# Patient Record
Sex: MALE | Race: WHITE | NOT HISPANIC OR LATINO | Employment: OTHER | URBAN - METROPOLITAN AREA
[De-identification: names, ages, dates, MRNs, and addresses within clinical notes are randomized per-mention and may not be internally consistent; named-entity substitution may affect disease eponyms.]

---

## 2019-03-23 ENCOUNTER — HOSPITAL ENCOUNTER (OUTPATIENT)
Dept: URGENT CARE | Facility: CLINIC | Age: 79
Discharge: HOME | End: 2019-03-23
Attending: NURSE PRACTITIONER

## 2020-02-29 ENCOUNTER — HOSPITAL ENCOUNTER (OUTPATIENT)
Dept: URGENT CARE | Facility: CLINIC | Age: 80
Discharge: HOME | End: 2020-02-29
Attending: FAMILY MEDICINE

## 2020-03-06 ENCOUNTER — HOSPITAL ENCOUNTER (OUTPATIENT)
Dept: ULTRASOUND IMAGING | Facility: HOSPITAL | Age: 80
Discharge: HOME | End: 2020-03-06
Attending: UROLOGY

## 2020-03-06 LAB
ANION GAP SERPL CALC-SCNC: 18 MMOL/L (ref 8–19)
BUN SERPL-MCNC: 10 MG/DL (ref 5–25)
BUN/CREAT SERPL: 11 {RATIO} (ref 6–20)
CALCIUM SERPL-MCNC: 9.3 MG/DL (ref 8.7–10.4)
CHLORIDE SERPL-SCNC: 100 MMOL/L (ref 99–111)
CONV CO2: 24 MMOL/L (ref 22–32)
CREAT UR-MCNC: 0.93 MG/DL (ref 0.7–1.2)
GFR SERPLBLD BASED ON 1.73 SQ M-ARVRAT: >60 ML/MIN/{1.73_M2}
GLUCOSE SERPL-MCNC: 120 MG/DL (ref 70–99)
OSMOLALITY SERPL CALC.SUM OF ELEC: 286 MOSM/KG (ref 273–304)
POTASSIUM SERPL-SCNC: 3.9 MMOL/L (ref 3.5–5.3)
SODIUM SERPL-SCNC: 138 MMOL/L (ref 135–147)

## 2020-03-12 ENCOUNTER — HOSPITAL ENCOUNTER (OUTPATIENT)
Dept: SURGERY | Facility: CLINIC | Age: 80
Discharge: HOME | End: 2020-03-12
Attending: UROLOGY

## 2020-03-12 ENCOUNTER — OFFICE VISIT CONVERTED (OUTPATIENT)
Dept: UROLOGY | Facility: CLINIC | Age: 80
End: 2020-03-12
Attending: UROLOGY

## 2020-03-13 ENCOUNTER — OFFICE VISIT CONVERTED (OUTPATIENT)
Dept: SURGERY | Facility: CLINIC | Age: 80
End: 2020-03-13
Attending: PHYSICIAN ASSISTANT

## 2020-03-14 LAB — BACTERIA UR CULT: ABNORMAL

## 2020-05-14 ENCOUNTER — PROCEDURE VISIT CONVERTED (OUTPATIENT)
Dept: UROLOGY | Facility: CLINIC | Age: 80
End: 2020-05-14
Attending: UROLOGY

## 2020-05-14 ENCOUNTER — HOSPITAL ENCOUNTER (OUTPATIENT)
Dept: SURGERY | Facility: CLINIC | Age: 80
Discharge: HOME | End: 2020-05-14
Attending: UROLOGY

## 2020-07-14 ENCOUNTER — OFFICE VISIT CONVERTED (OUTPATIENT)
Dept: UROLOGY | Facility: CLINIC | Age: 80
End: 2020-07-14
Attending: UROLOGY

## 2020-09-23 ENCOUNTER — OFFICE VISIT CONVERTED (OUTPATIENT)
Dept: UROLOGY | Facility: CLINIC | Age: 80
End: 2020-09-23
Attending: UROLOGY

## 2020-09-23 ENCOUNTER — CONVERSION ENCOUNTER (OUTPATIENT)
Dept: SURGERY | Facility: CLINIC | Age: 80
End: 2020-09-23

## 2020-10-28 ENCOUNTER — CONVERSION ENCOUNTER (OUTPATIENT)
Dept: SURGERY | Facility: CLINIC | Age: 80
End: 2020-10-28

## 2020-10-28 ENCOUNTER — OFFICE VISIT CONVERTED (OUTPATIENT)
Dept: UROLOGY | Facility: CLINIC | Age: 80
End: 2020-10-28
Attending: UROLOGY

## 2021-01-22 ENCOUNTER — CONVERSION ENCOUNTER (OUTPATIENT)
Dept: SURGERY | Facility: CLINIC | Age: 81
End: 2021-01-22

## 2021-01-22 ENCOUNTER — OFFICE VISIT CONVERTED (OUTPATIENT)
Dept: UROLOGY | Facility: CLINIC | Age: 81
End: 2021-01-22
Attending: NURSE PRACTITIONER

## 2021-02-26 ENCOUNTER — CONVERSION ENCOUNTER (OUTPATIENT)
Dept: SURGERY | Facility: CLINIC | Age: 81
End: 2021-02-26

## 2021-03-03 ENCOUNTER — HOSPITAL ENCOUNTER (OUTPATIENT)
Dept: VACCINE CLINIC | Facility: HOSPITAL | Age: 81
Discharge: HOME | End: 2021-03-03
Attending: INTERNAL MEDICINE

## 2021-03-24 ENCOUNTER — HOSPITAL ENCOUNTER (OUTPATIENT)
Dept: VACCINE CLINIC | Facility: HOSPITAL | Age: 81
Discharge: HOME | End: 2021-03-24
Attending: INTERNAL MEDICINE

## 2021-03-26 ENCOUNTER — OFFICE VISIT CONVERTED (OUTPATIENT)
Dept: UROLOGY | Facility: CLINIC | Age: 81
End: 2021-03-26
Attending: UROLOGY

## 2021-04-22 ENCOUNTER — HOSPITAL ENCOUNTER (OUTPATIENT)
Dept: PREADMISSION TESTING | Facility: HOSPITAL | Age: 81
Discharge: HOME | End: 2021-04-22
Attending: UROLOGY

## 2021-04-22 LAB
ANION GAP SERPL CALC-SCNC: 12 MMOL/L (ref 8–19)
BASOPHILS # BLD AUTO: 0.05 10*3/UL (ref 0–0.2)
BASOPHILS NFR BLD AUTO: 1.1 % (ref 0–3)
BUN SERPL-MCNC: 11 MG/DL (ref 5–25)
BUN/CREAT SERPL: 11 {RATIO} (ref 6–20)
CALCIUM SERPL-MCNC: 10.2 MG/DL (ref 8.7–10.4)
CHLORIDE SERPL-SCNC: 101 MMOL/L (ref 99–111)
CONV ABS IMM GRAN: 0.02 10*3/UL (ref 0–0.2)
CONV CO2: 27 MMOL/L (ref 22–32)
CONV IMMATURE GRAN: 0.5 % (ref 0–1.8)
CREAT UR-MCNC: 1 MG/DL (ref 0.7–1.2)
DEPRECATED RDW RBC AUTO: 45 FL (ref 35.1–43.9)
EOSINOPHIL # BLD AUTO: 0.06 10*3/UL (ref 0–0.7)
EOSINOPHIL # BLD AUTO: 1.4 % (ref 0–7)
ERYTHROCYTE [DISTWIDTH] IN BLOOD BY AUTOMATED COUNT: 14 % (ref 11.6–14.4)
GFR SERPLBLD BASED ON 1.73 SQ M-ARVRAT: >60 ML/MIN/{1.73_M2}
GLUCOSE SERPL-MCNC: 106 MG/DL (ref 70–99)
HCT VFR BLD AUTO: 41 % (ref 42–52)
HGB BLD-MCNC: 13.5 G/DL (ref 14–18)
INR PPP: 1.01 (ref 2–3)
LYMPHOCYTES # BLD AUTO: 1.02 10*3/UL (ref 1–5)
LYMPHOCYTES NFR BLD AUTO: 23.2 % (ref 20–45)
MCH RBC QN AUTO: 29.1 PG (ref 27–31)
MCHC RBC AUTO-ENTMCNC: 32.9 G/DL (ref 33–37)
MCV RBC AUTO: 88.4 FL (ref 80–96)
MONOCYTES # BLD AUTO: 0.35 10*3/UL (ref 0.2–1.2)
MONOCYTES NFR BLD AUTO: 8 % (ref 3–10)
NEUTROPHILS # BLD AUTO: 2.89 10*3/UL (ref 2–8)
NEUTROPHILS NFR BLD AUTO: 65.8 % (ref 30–85)
NRBC CBCN: 0 % (ref 0–0.7)
OSMOLALITY SERPL CALC.SUM OF ELEC: 282 MOSM/KG (ref 273–304)
PLATELET # BLD AUTO: 239 10*3/UL (ref 130–400)
PMV BLD AUTO: 10.1 FL (ref 9.4–12.4)
POTASSIUM SERPL-SCNC: 4.4 MMOL/L (ref 3.5–5.3)
PROTHROMBIN TIME: 11.1 S (ref 9.4–12)
RBC # BLD AUTO: 4.64 10*6/UL (ref 4.7–6.1)
SODIUM SERPL-SCNC: 136 MMOL/L (ref 135–147)
WBC # BLD AUTO: 4.39 10*3/UL (ref 4.8–10.8)

## 2021-04-23 LAB — SARS-COV-2 RNA SPEC QL NAA+PROBE: NOT DETECTED

## 2021-04-24 LAB
AMPICILLIN SUSC ISLT: 16
AMPICILLIN SUSC ISLT: >=32
AMPICILLIN+SULBAC SUSC ISLT: 4
AMPICILLIN+SULBAC SUSC ISLT: 8
BACTERIA UR CULT: ABNORMAL
CEFAZOLIN SUSC ISLT: 8
CEFAZOLIN SUSC ISLT: <=4
CEFEPIME SUSC ISLT: <=0.12
CEFEPIME SUSC ISLT: <=0.12
CEFTAZIDIME SUSC ISLT: <=1
CEFTAZIDIME SUSC ISLT: <=1
CEFTRIAXONE SUSC ISLT: <=0.25
CEFTRIAXONE SUSC ISLT: <=0.25
CIPROFLOXACIN SUSC ISLT: <=0.25
CIPROFLOXACIN SUSC ISLT: <=0.25
ERTAPENEM SUSC ISLT: <=0.12
ERTAPENEM SUSC ISLT: <=0.12
GENTAMICIN SUSC ISLT: <=1
GENTAMICIN SUSC ISLT: <=1
LEVOFLOXACIN SUSC ISLT: <=0.12
LEVOFLOXACIN SUSC ISLT: <=0.12
NITROFURANTOIN SUSC ISLT: 32
NITROFURANTOIN SUSC ISLT: 64
PIP+TAZO SUSC ISLT: <=4
PIP+TAZO SUSC ISLT: <=4
TMP SMX SUSC ISLT: <=20
TMP SMX SUSC ISLT: <=20
TOBRAMYCIN SUSC ISLT: <=1
TOBRAMYCIN SUSC ISLT: <=1

## 2021-05-06 ENCOUNTER — HOSPITAL ENCOUNTER (OUTPATIENT)
Dept: GENERAL RADIOLOGY | Facility: HOSPITAL | Age: 81
Discharge: HOME | End: 2021-05-06
Attending: UROLOGY

## 2021-05-07 ENCOUNTER — OFFICE VISIT CONVERTED (OUTPATIENT)
Dept: UROLOGY | Facility: CLINIC | Age: 81
End: 2021-05-07
Attending: UROLOGY

## 2021-05-07 ENCOUNTER — CONVERSION ENCOUNTER (OUTPATIENT)
Dept: SURGERY | Facility: CLINIC | Age: 81
End: 2021-05-07

## 2021-05-10 NOTE — PROCEDURES
Procedure Note      Patient Name: Abimael Randall   Patient ID: [id number]   Sex: Male   YOB: 1940    Primary Care Provider: Kwame Mckeon MD   Referring Provider: Kwame Mckeon MD    Visit Date: January 22, 2021    Provider: JF Grimaldo   Location: [facility] [place] [facility]   Location Address: [address] [place] [address]  [id number]   Location Phone: [phone]             80-year-old male patient presents in office on referral from his home health nurse.  The patient had complaints of his Mathias catheter not draining.    The patient's Mathias catheter was examined and found to be free of any kinks.    Old Mathias catheter was removed and catheter was replaced with a 16fr coude catheter with a 10 ml balloon. yellow urine was seen in catheter bag.           Assessment  · Urinary retention     788.20/R33.9      Plan  · Orders  o Insertion of indwelling Mathias catheter (44019) - 788.20/R33.9 - 01/22/2021  · Instructions  o Patient to follow-up in office in 4/21 as scheduled             Electronically Signed by: JF Grimaldo -Author on January 24, 2021 01:10:16 PM

## 2021-05-10 NOTE — PROCEDURES
Procedure Note      Patient Name: Abimael Randall   Patient ID: [id number]   Sex: Male   YOB: 1940    Primary Care Provider: Kwame Mckeon MD   Referring Provider: Kwame Mckeon MD    Visit Date: May 14, 2020    Provider: Rosalva Mireles MD   Location: [facility]   Location Address: [address] [place] [address]  [id number]   Location Phone: [phone]          Cystoscopy Procedure:  PROCEDURE: His indwelling ramos was removed. Flexible cystoscope was passed per urethra into the bladder without difficulty after proper consent. There were no urethral abnormalities noted. The prostatic urethra was notable enlarged with bilateral coapting lobes and median lobe. The bladder was inspected in a systematic meridian fashion. There were some small diverticula and mild trabeculations. There was a lesion with erythema and edema at the posterior bladder wall; likely from indwelling catheter. No other lesions or tumors noted. 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 Some urine was obtained through the scope to be sent for pathology. Both ureteral orifices were identified and were normal in appearance. The flexible cystoscope was removed. A 18fr coude catheter was inserted without difficulty and secured. The patient tolerated the procedure well.   FOLLOW UP OFFICE NOTE: No complaints today; denies changes since last visit. Indwelling catheter in place.           Assessment  · BPH (benign prostatic hyperplasia)     600.00/N40.0  · Bladder lump     596.89/N32.89    Problems Reconciled  Plan  · Orders  o Cystoscopy (22917) - - 05/16/2020  · Medications  o Medications have been Reconciled  o Transition of Care or Provider Policy  · Instructions  o Electronically Identified Patient Education Materials Provided Electronically     Urine to be sent for cytology; will notify of result  Findings discussed with patient; lesion believed to be associated with indwelling Ramos; obtain cytology and continue to  monitor.  Patient aware that may warrant biopsy at some point.  Enlarged prostate with intravesical tissue; findings of chronic bladder outlet obstruction including trabeculations and diverticula.    Tolerated procedure well.  Instructions provided.  Patient to maintain Mathias catheter until further work-up performed.  Schedule urodynamic testing  Patient to follow-up, zoom visit okay, after UDS for further management discussion    All questions addressed             Electronically Signed by: Rosalva Mireles MD -Author on May 16, 2020 01:44:25 PM

## 2021-05-12 ENCOUNTER — CONVERSION ENCOUNTER (OUTPATIENT)
Dept: SURGERY | Facility: CLINIC | Age: 81
End: 2021-05-12

## 2021-05-13 NOTE — PROGRESS NOTES
"   Progress Note      Patient Name: Abimael Randall   Patient ID: [id number]   Sex: Male   YOB: 1940    Primary Care Provider: Kwame Mckeon MD   Referring Provider: Kwame Mckeon MD    Visit Date: July 14, 2020    Provider: Rosalva Mireles MD   Location: [facility]   Location Address: [address] [place] [address]  [id number]   Location Phone: [phone]          Chief Complaint  · Pt is here for urological concerns      History Of Present Illness     1300 cc of retained urine. Patient noted immediate relief of pain.     Denies h/o bph; did not take prostate medication. Placed on Flomax in hospital, has continued to take.   States he was voiding without issue prior to presentation; denies baseline weak stream, sensation of incomplete emptying, hesitancy, frequency, or straining.    Hoping to get catheter out, but willing to keep while w/u being performed.     Had renal u/s and bmp prior to visit. DESIREE with resolution of hydronephrosis.   Cr 3/20: 0.93  CT during hospitalization revealed >100gm prostate    Update 7/14/20: Presents for f/u after UDS and cystoscopy. Indwelling catheter remains in place; not particularly bothered by it. Changed monthly by caretenders. Hoping to get catheter out eventually.     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 Creatinine of 7 on presentation went down with catheter drainage. Had gone 3 days prior to presentation without urinating.  Workup in ER included CT of the abdomen and pelvis indicating severe bladder distention, bilateral hydronephrosis, and protatomegaly. Mathias catheter was placed in ER without difficulty yield >1300 cc of retained urine. Patient noted immediate relief of pain.     Denies h/o bph; did not take " "prostate medication. Placed on Flomax in hospital, has continued to take.   States he was voiding without issue prior to presentation; denies baseline weak stream, sensation of incomplete emptying, hesitancy, frequency, or straining.    Hoping to get catheter out, but willing to keep while w/u being performed.     Had renal u/s and bmp prior to visit. DESIREE with resolution of hydronephrosis.   Cr 3/20: 0.93  CT during hospitalization revealed >100gm prostate    Update 7/14/20: Presents for f/u after UDS and cystoscopy. Indwelling catheter remains in place; not particularly bothered by it. Changed monthly by caretenders. Hoping to get catheter out eventually.     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  · Head and Face  o Head  o :   § Inspection  § : Normocephalic, atraumatic  o Face  o :   § Inspection  § : No facial lesions  · Respiratory  o Respiratory Effort  o : Breathing is unlabored without accessory muscle use  o Inspection of Chest  o : Normal appearance, no retractions  · Cardiovascular  o Heart  o : normal rate   · Skin and Subcutaneous Tissue  o General Inspection  o : No rashes, lesions or areas of discoloration present. Skin turgor is normal.  · Neurologic  o Mental Status Examination  o :   § Orientation  § : alert and oriented x 3  o Gait and Station  o :   § Gait Screening  § : Ambulating wiithout difficulty  · Psychiatric  o Mood and " "Affect  o : mood normal, affect appropriate              Assessment  · BPH loc w urin obs/LUTS       Benign prostatic hyperplasia with lower urinary tract symptoms     600.21/N40.1  · Urinary retention     788.20/R33.9    Problems Reconciled  Plan  · Medications  o Medications have been Reconciled  o Transition of Care or Provider Policy  · Instructions  o Electronically Identified Patient Education Materials Provided Electronically     UDS and cystoscopy- results discussed;   Would likely benefit from bladder outlet obstruction procedure; recommend TURP r/b/a discussed at length including possibility of need for prolonged catheterization despite surgery    Understands that due to h/o hydro and GERTRUDIS assc with urinary retention, will warrant indefinite decompression of the bladder; recommend continued decompression via catheter; options provided of indwelling vs CIC; patient does not believe he could do CIC, prefers indwelling for now    \"extremely leary of surgery\" uncertain if willing to undergo a procedure  Continue Flomax; start finasteride; side effects discussed  Continue routine cath changes monthly Home Health    F/u 3 mo; wishes to discuss surgical mgt with PCP to assess surgical risks from a medical standpoint  All questions addressed    Total time for encounter greater than 15 minutes due to counseling and coordination of care which dominated greater than 51% of the encounter.             Electronically Signed by: Rosalva Mireles MD -Author on July 17, 2020 05:12:01 PM  "

## 2021-05-13 NOTE — PROGRESS NOTES
"   Progress Note      Patient Name: Abimael Randall   Patient ID: [id number]   Sex: Male   YOB: 1940    Primary Care Provider: Kwame Mckeon MD   Referring Provider: Kwame Mckeon MD    Visit Date: September 23, 2020    Provider: Rosalva Mireles MD   Location: [facility]   Location Address: [address] [place] [address]  [id number]   Location Phone: [phone]          Chief Complaint  · Patient is here for urological symptoms      History Of Present Illness     1300 cc of retained urine. Patient noted immediate relief of pain.     Denies h/o bph; did not take prostate medication. Placed on Flomax in hospital, has continued to take.   States he was voiding without issue prior to presentation; denies baseline weak stream, sensation of incomplete emptying, hesitancy, frequency, or straining.    Hoping to get catheter out, but willing to keep while w/u being performed.     Had renal u/s and bmp prior to visit. DESIREE with resolution of hydronephrosis.   Cr 3/20: 0.93  CT during hospitalization revealed >100gm prostate    Update 7/14/20: Presents for f/u after UDS and cystoscopy. Indwelling catheter remains in place; not particularly bothered by it. Changed monthly by caretenders. Hoping to get catheter out eventually.     Update 9/23/20: Presents for follow up due to indwelling ramos issues. C/o leaking around catheter; no pain. Was exchanged by home health on Sunday due to catheter malfunction; leakage around penis 2-3 days prior. Continues to deny significant bother with catheter. Wonders if he should proceed surgery.     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 Creatinine of 7 on presentation went down with catheter drainage. Had gone 3 " days prior to presentation without urinating.  Workup in ER included CT of the abdomen and pelvis indicating severe bladder distention, bilateral hydronephrosis, and protatomegaly. Ramos catheter was placed in ER without difficulty yield >1300 cc of retained urine. Patient noted immediate relief of pain.     Denies h/o bph; did not take prostate medication. Placed on Flomax in hospital, has continued to take.   States he was voiding without issue prior to presentation; denies baseline weak stream, sensation of incomplete emptying, hesitancy, frequency, or straining.    Hoping to get catheter out, but willing to keep while w/u being performed.     Had renal u/s and bmp prior to visit. DESIREE with resolution of hydronephrosis.   Cr 3/20: 0.93  CT during hospitalization revealed >100gm prostate    Update 7/14/20: Presents for f/u after UDS and cystoscopy. Indwelling catheter remains in place; not particularly bothered by it. Changed monthly by caretenders. Hoping to get catheter out eventually.     Update 9/23/20: Presents for follow up due to indwelling ramos issues. C/o leaking around catheter; no pain. Was exchanged by home health on Sunday due to catheter malfunction; leakage around penis 2-3 days prior. Continues to deny significant bother with catheter. Wonders if he should proceed surgery.     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; urge urinary incontinence; strong detrusor with weak flow       Past Medical History  Prostate Disorder; Sleep disorder         Past Surgical History  Colonoscopy; Tonsilectomy         Medication List  Colace 100 mg oral capsule; finasteride 5 mg oral tablet; tamsulosin 0.4 mg oral capsule         Allergy List  PENICILLINS       Allergies Reconciled  Family Medical History  Family history of breast cancer         Social History  Tobacco (Former)         Review of Systems  · Constitutional  o Denies  o : fever,  "chills  · Gastrointestinal  o Denies  o : nausea, vomiting      Vitals  Date Time BP Position Site L\R Cuff Size HR RR TEMP (F) WT  HT  BMI kg/m2 BSA m2 O2 Sat HC       09/23/2020 02:07 PM       14  152lbs 0oz 5'  11\" 21.2 1.86           Physical Examination  · Constitutional  o Appearance  o : Well nourished, well developed patient in no acute distress.  · Eyes  o Conjunctivae  o : Conjunctivae normal  o Sclerae  o : Sclerae white  · Ears, Nose, Mouth and Throat  o Ears  o :   § Hearing  § : Intact to conversational voice both ears  § Ears  § : Normal  o Nose  o :   § External Nose  § : Appearance normal  · Neck  o Inspection/Palpation  o : Normal appearance, trachea midline  · Respiratory  o Respiratory Effort  o : Breathing unlabored without accessory muscle use  o Inspection of Chest  o : Normal appearance, no retractions  o Auscultation of Lungs  o : Normal breath sounds  · Cardiovascular  o Heart  o : Normal Rate  · Gastrointestinal  o Abdominal Examination  o : Appears soft and nondistended  · Skin and Subcutaneous Tissue  o General Inspection  o : No rashes, lesions, or areas of discoloration present  o General Palpation  o : Skin Turgor Normal  · Neurologic  o Mental Status Examination  o :   § Orientation  § : Alert and Oriented X3  o Gait and Station  o :   § Gait Screening  § : Ambulating without difficulty  · Psychiatric  o Mood and Affect  o : Mood normal, affect appropriate          Results  · In-Office Procedures  o Surgical procedure  § IOP - Bladder Scan/Residual Urine (78998)   § Specimen vol Ur: 129   § Catheterization for residual urine (25202)       Assessment  · BPH loc w urin obs/LUTS       Benign prostatic hyperplasia with lower urinary tract symptoms     600.21/N40.1  · Urinary retention     788.20/R33.9    Problems Reconciled  Plan  · Medications  o Medications have been Reconciled  o Transition of Care or Provider Policy  · Instructions  o Electronically Identified Patient Education " Materials Provided Electronically     Residual despite ramos, 120cc; catheter exchanged without issue  Home health to contine to change catheter every month     Discussed again that he would likely benefit from bladder outlet obstruction procedure; Discussed robotic simple prostatectomy versus TURP r/b/a of each discussed at length including possibility of need for prolonged catheterization despite surgery. Given size of gland, 100cc, would likely benefit more from simple prostatectomy as may need staged TURP required more than one procedure.     Understands that due to h/o hydro and GERTRUDIS assc with urinary retention, will warrant indefinite decompression of the bladder; recommend continued decompression via catheter; options provided of indwelling vs CIC; patient does not believe he could do CIC, prefers indwelling for now    Continues to be uncertain if willing to undergo a procedure  Continue Flomax and finasteride; side effects discussed  Continue routine cath changes monthly Home Health    F/u 3 mo as previously scheduled  All questions addressed    Total time for encounter greater than 25 minutes due to counseling and coordination of care which dominated greater than 51% of the encounter.             Electronically Signed by: Rosalva Mireles MD -Author on September 23, 2020 03:23:58 PM

## 2021-05-13 NOTE — PROGRESS NOTES
"   Progress Note      Patient Name: Abimael Randall   Patient ID: [id number]   Sex: Male   YOB: 1940    Primary Care Provider: Kwame Mckeon MD   Referring Provider: Kwame Mckeon MD    Visit Date: October 28, 2020    Provider: Rosalva Mierles MD   Location[place] [facility]   Location Address: [address] [place] [address]  [id number]   Location Phone: [phone]          Chief Complaint  · Patient is here for urological symptoms      History Of Present Illness     1300 cc of retained urine. Patient noted immediate relief of pain.     Denies h/o bph; did not take prostate medication. Placed on Flomax in hospital, has continued to take.   States he was voiding without issue prior to presentation; denies baseline weak stream, sensation of incomplete emptying, hesitancy, frequency, or straining.    Hoping to get catheter out, but willing to keep while w/u being performed.     Had renal u/s and bmp prior to visit. DESIREE with resolution of hydronephrosis.   Cr 3/20: 0.93  CT during hospitalization revealed >100gm prostate    Update 7/14/20: Presents for f/u after UDS and cystoscopy. Indwelling catheter remains in place; not particularly bothered by it. Changed monthly by caretenders. Hoping to get catheter out eventually.     Update 9/23/20: Presents for follow up due to indwelling ramos issues. C/o leaking around catheter; no pain. Was exchanged by home health on Sunday due to catheter malfunction; leakage around penis 2-3 days prior. Continues to deny significant bother with catheter. Wonders if he should proceed surgery.     Update 10/28/2020: Patient presents for follow-up.  Catheter in place.  Denies issues catheter.  Changed last week.. No complaints today.     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 Creatinine of 7 on presentation went down with catheter drainage. Had gone 3 days prior to presentation without urinating.  Workup in ER included CT of the abdomen and pelvis indicating severe bladder distention, bilateral hydronephrosis, and protatomegaly. Ramos catheter was placed in ER without difficulty yield >1300 cc of retained urine. Patient noted immediate relief of pain.     Denies h/o bph; did not take prostate medication. Placed on Flomax in hospital, has continued to take.   States he was voiding without issue prior to presentation; denies baseline weak stream, sensation of incomplete emptying, hesitancy, frequency, or straining.    Hoping to get catheter out, but willing to keep while w/u being performed.     Had renal u/s and bmp prior to visit. DESIREE with resolution of hydronephrosis.   Cr 3/20: 0.93  CT during hospitalization revealed >100gm prostate    Update 7/14/20: Presents for f/u after UDS and cystoscopy. Indwelling catheter remains in place; not particularly bothered by it. Changed monthly by caretenders. Hoping to get catheter out eventually.     Update 9/23/20: Presents for follow up due to indwelling ramos issues. C/o leaking around catheter; no pain. Was exchanged by home health on Sunday due to catheter malfunction; leakage around penis 2-3 days prior. Continues to deny significant bother with catheter. Wonders if he should proceed surgery.     Update 10/28/2020: Patient presents for follow-up.  Catheter in place.  Denies issues catheter.  Changed last week.. No complaints today.     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; urge urinary incontinence; strong detrusor with weak flow       Past Medical History  Prostate Disorder; Sleep disorder         Past Surgical History  Colonoscopy; Tonsilectomy         Medication List  Colace 100 mg oral capsule; finasteride 5 mg oral  "tablet; tamsulosin 0.4 mg oral capsule         Allergy List  PENICILLINS       Allergies Reconciled  Family Medical History  Family history of breast cancer         Social History  Tobacco (Former)         Review of Systems  · Constitutional  o Denies  o : chills, fever  · Gastrointestinal  o Denies  o : nausea, vomiting      Vitals  Date Time BP Position Site L\R Cuff Size HR RR TEMP (F) WT  HT  BMI kg/m2 BSA m2 O2 Sat FR L/min FiO2 HC       10/28/2020 12:25 PM       16  159lbs 6oz 5'  11\" 22.23 1.9             Physical Examination  · Constitutional  o Appearance  o : Well nourished, well developed patient in no acute distress.  · Eyes  o Conjunctivae  o : Conjunctivae normal  o Sclerae  o : Sclerae white  · Ears, Nose, Mouth and Throat  o Ears  o :   § Hearing  § : Intact to conversational voice both ears  § Ears  § : Normal  o Nose  o :   § External Nose  § : Appearance normal  · Neck  o Inspection/Palpation  o : Normal appearance, trachea midline  · Lymphatic  o Neck  o : No palpable lymphadenopathy present  · Skin and Subcutaneous Tissue  o General Inspection  o : No rashes, lesions, or areas of discoloration present  o General Palpation  o : Skin Turgor Normal  · Neurologic  o Mental Status Examination  o :   § Orientation  § : Alert and Oriented X3  o Gait and Station  o :   § Gait Screening  § : Ambulating without difficulty  · Psychiatric  o Mood and Affect  o : Mood normal, affect appropriate              Assessment  · BPH loc w urin obs/LUTS       Benign prostatic hyperplasia with lower urinary tract symptoms     600.21/N40.1  · Urinary retention     788.20/R33.9    Problems Reconciled  Plan  · Orders  o Renal Ultrasound-bilateral (66466, 62269) - 600.21/N40.1, 788.20/R33.9 - 04/28/2021  · Medications  o Medications have been Reconciled  o Transition of Care or Provider Policy  · Instructions  o Electronically Identified Patient Education Materials Provided Electronically     No issues with catheter; " remains uncertain if he would like to go through procedure; discussed again that proceeding with surgical intervention is his decision given associated risk versus benefits.  Discussed again that he would likely benefit from bladder outlet obstruction procedure; Discussed robotic simple prostatectomy versus TURP r/b/a of each discussed at length including possibility of need for prolonged catheterization despite surgery. Given size of gland, 100cc, would likely benefit more from simple prostatectomy as may need staged TURP required more than one procedure. Discussed again that proceeding with surgical intervention is his decision given associated risk versus benefits and his personal goals of care.    Continue Flomax and finasteride; side effects discussed  Continue routine cath changes monthly Home Health    F/u 6 months with surveillance upper tract imaging , DESIREE prior or earlier as needed  All questions addressed               Electronically Signed by: Rosalva Mireles MD -Author on October 28, 2020 12:39:00 PM

## 2021-05-14 VITALS — WEIGHT: 152 LBS | BODY MASS INDEX: 21.28 KG/M2 | HEIGHT: 71 IN | RESPIRATION RATE: 14 BRPM

## 2021-05-14 VITALS — RESPIRATION RATE: 12 BRPM | HEIGHT: 71 IN | WEIGHT: 154 LBS | BODY MASS INDEX: 21.56 KG/M2

## 2021-05-14 VITALS — WEIGHT: 154.37 LBS | HEIGHT: 71 IN | RESPIRATION RATE: 16 BRPM | BODY MASS INDEX: 21.61 KG/M2

## 2021-05-14 VITALS — BODY MASS INDEX: 22.31 KG/M2 | HEIGHT: 71 IN | WEIGHT: 159.37 LBS | RESPIRATION RATE: 16 BRPM

## 2021-05-14 NOTE — PROGRESS NOTES
Progress Note      Patient Name: Abimael Randall   Patient ID: [id number]   Sex: Male   YOB: 1940    Primary Care Provider: Kwame Mckeon MD   Referring Provider: Kwame Mckeon MD    Visit Date: March 26, 2021    Provider: Rosalva Mireles MD   Location: [facility] [place] [facility]   Location Address: [address] [place] [address]  [id number]   Location Phone: [phone]          Chief Complaint  · Patient is here for urological symptoms      History Of Present Illness     1300 cc of retained urine. Patient noted immediate relief of pain.     Denies h/o bph; did not take prostate medication. Placed on Flomax in hospital, has continued to take.   States he was voiding without issue prior to presentation; denies baseline weak stream, sensation of incomplete emptying, hesitancy, frequency, or straining.    Hoping to get catheter out, but willing to keep while w/u being performed.     Had renal u/s and bmp prior to visit. DESIREE with resolution of hydronephrosis.   Cr 3/20: 0.93  CT during hospitalization revealed >100gm prostate    Update 7/14/20: Presents for f/u after UDS and cystoscopy. Indwelling catheter remains in place; not particularly bothered by it. Changed monthly by caretenders. Hoping to get catheter out eventually.     Update 9/23/20: Presents for follow up due to indwelling ramos issues. C/o leaking around catheter; no pain. Was exchanged by home health on Sunday due to catheter malfunction; leakage around penis 2-3 days prior. Continues to deny significant bother with catheter. Wonders if he should proceed surgery.     Update 10/28/2020: Patient presents for follow-up.  Catheter in place.  Denies issues catheter.  Changed last week.. No complaints today.     Update 3/26/2021: Patient presents for routine catheter exchange.  Has been thinking a great deal about options for management.  Becoming progressively more bothered by catheter.  Would like to proceed with  "surgery.    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 Creatinine of 7 on presentation went down with catheter drainage. Had gone 3 days prior to presentation without urinating.  Workup in ER included CT of the abdomen and pelvis indicating severe bladder distention, bilateral hydronephrosis, and protatomegaly. Ramos catheter was placed in ER without difficulty yield >1300 cc of retained urine. Patient noted immediate relief of pain.     Denies h/o bph; did not take prostate medication. Placed on Flomax in hospital, has continued to take.   States he was voiding without issue prior to presentation; denies baseline weak stream, sensation of incomplete emptying, hesitancy, frequency, or straining.    Hoping to get catheter out, but willing to keep while w/u being performed.     Had renal u/s and bmp prior to visit. DESIREE with resolution of hydronephrosis.   Cr 3/20: 0.93  CT during hospitalization revealed >100gm prostate    Update 7/14/20: Presents for f/u after UDS and cystoscopy. Indwelling catheter remains in place; not particularly bothered by it. Changed monthly by caretenders. Hoping to get catheter out eventually.     Update 9/23/20: Presents for follow up due to indwelling ramos issues. C/o leaking around catheter; no pain. Was exchanged by home health on Sunday due to catheter malfunction; leakage around penis 2-3 days prior. Continues to deny significant bother with catheter. Wonders if he should proceed surgery.     Update 10/28/2020: Patient presents for follow-up.  Catheter in place.  Denies issues catheter.  Changed last week.. No complaints today.     Update 3/26/2021: Patient presents for routine catheter exchange.  Has been thinking a great deal about options for " management.  Becoming progressively more bothered by catheter.  Would like to proceed with surgery.    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; urge urinary incontinence; strong detrusor with weak flow       Past Medical History  Prostate Disorder; Sleep disorder         Past Surgical History  Colonoscopy; Tonsilectomy         Medication List  Colace 100 mg oral capsule; finasteride 5 mg oral tablet; oxybutynin chloride 10 mg oral tablet extended release 24hr; tamsulosin 0.4 mg oral capsule; trazodone 50 mg oral tablet         Allergy List  PENICILLINS       Allergies Reconciled  Family Medical History  Family history of breast cancer         Social History  Tobacco (Former)         Review of Systems  · Constitutional  o Denies  o : fever, chills  · Gastrointestinal  o Denies  o : nausea, vomiting      Physical Examination  · Constitutional  o Appearance  o : Well nourished, well developed patient in no acute distress.  · Eyes  o Conjunctivae  o : Conjunctivae normal  o Sclerae  o : Sclerae white  · Ears, Nose, Mouth and Throat  o Ears  o :   § Hearing  § : Intact to conversational voice both ears  § Ears  § : Normal  o Nose  o :   § External Nose  § : Appearance normal  · Neck  o Inspection/Palpation  o : Normal appearance, trachea midline  · Skin and Subcutaneous Tissue  o General Inspection  o : No rashes, lesions, or areas of discoloration present  o General Palpation  o : Skin Turgor Normal  · Neurologic  o Mental Status Examination  o :   § Orientation  § : Alert and Oriented X3  o Gait and Station  o :   § Gait Screening  § : Ambulating without difficulty  · Psychiatric  o Mood and Affect  o : Mood normal, affect appropriate              Assessment  · BPH loc w urin obs/LUTS       Benign prostatic hyperplasia with lower urinary tract symptoms     600.21/N40.1  · Urinary retention     788.20/R33.9    Problems Reconciled  Plan  · Orders  o Insertion of  "indwelling Mathias catheter (66907) - 600.21/N40.1, 788.20/R33.9 - 03/26/2021  · Medications  o Medications have been Reconciled  o Transition of Care or Provider Policy  · Instructions  o Electronically Identified Patient Education Materials Provided Electronically     100gm prostate. Believe that he would  likely benefit from bladder outlet obstruction procedure.   Patient with considerable prostatomegaly which may be best served by robotic assisted laparoscopic simple prostatectomy.  The procedure was discussed at length.  Risks, benefits, and alternatives discussed with patient.  Risks including bleeding, infection, damage to surrounding structures, pain, need for further procedures, durability of procedure, need for postop catheter, sexual side effects, and possibility of worsening LUTS requiring medications postop, and possibility of persistent incomplete emptying requiring catheterization despite procedure.  Aware that this is a procedure under general anesthesia and the associated risk.    Patient agreeable to bladder outlet procedure as motivated to void spontaneously without catheterization.  Plan for robotic assisted laparoscopic simple prostatectomy   Schedule OR  All questions addressed     MDM moderate: 1 chronic illness with exacerbation progression or side effect of treatment; decision regarding surgery with identified patient or procedure risk factors\">catheter exchanged without difficulty; patient tolerated well    Discussed again options for mgt of his BPH urinary retention given his >100gm prostate. Believe that he would  likely benefit from bladder outlet obstruction procedure.   Patient with considerable prostatomegaly which may be best served by robotic assisted laparoscopic simple prostatectomy.  The procedure was discussed at length.  Risks, benefits, and alternatives discussed with patient.  Risks including bleeding, infection, damage to surrounding structures, pain, need for further " procedures, durability of procedure, need for postop catheter, sexual side effects, and possibility of worsening LUTS requiring medications postop, and possibility of persistent incomplete emptying requiring catheterization despite procedure.  Aware that this is a procedure under general anesthesia and the associated risk.    Patient agreeable to bladder outlet procedure as motivated to void spontaneously without catheterization.  Plan for robotic assisted laparoscopic simple prostatectomy   Schedule OR  All questions addressed     MDM moderate: 1 chronic illness with exacerbation progression or side effect of treatment; decision regarding surgery with identified patient or procedure risk factors             Electronically Signed by: Rosalva Mireles MD -Author on March 26, 2021 02:02:23 PM

## 2021-05-15 VITALS — HEIGHT: 71 IN | WEIGHT: 158 LBS | RESPIRATION RATE: 14 BRPM | BODY MASS INDEX: 22.12 KG/M2

## 2021-05-15 VITALS — HEIGHT: 71 IN | WEIGHT: 152.25 LBS | BODY MASS INDEX: 21.31 KG/M2 | RESPIRATION RATE: 12 BRPM

## 2021-05-15 VITALS — OXYGEN SATURATION: 99 % | RESPIRATION RATE: 12 BRPM | HEART RATE: 78 BPM

## 2021-05-26 ENCOUNTER — OFFICE VISIT CONVERTED (OUTPATIENT)
Dept: UROLOGY | Facility: CLINIC | Age: 81
End: 2021-05-26
Attending: UROLOGY

## 2021-05-26 ENCOUNTER — CONVERSION ENCOUNTER (OUTPATIENT)
Dept: SURGERY | Facility: CLINIC | Age: 81
End: 2021-05-26

## 2021-06-06 NOTE — PROGRESS NOTES
Progress Note      Patient Name: Abimael Randall   Patient ID: [id number]   Sex: Male   YOB: 1940    Primary Care Provider: Kwame Mckeon MD   Referring Provider: Kwame Mckeon MD    Visit Date: May 26, 2021    Provider: Rosalva Mireles MD   Location: [facility] [place] [facility]   Location Address: [address] [place] [address]  [id number]   Location Phone: [phone]          Chief Complaint  · Patient is here for urological symptoms      History Of Present Illness     1300 cc of retained urine. Patient noted immediate relief of pain.     Denies h/o bph; did not take prostate medication. Placed on Flomax in hospital, has continued to take.   States he was voiding without issue prior to presentation; denies baseline weak stream, sensation of incomplete emptying, hesitancy, frequency, or straining.    Hoping to get catheter out, but willing to keep while w/u being performed.     Had renal u/s and bmp prior to visit. DESIREE with resolution of hydronephrosis.   Cr 3/20: 0.93  CT during hospitalization revealed >100gm prostate    Update 7/14/20: Presents for f/u after UDS and cystoscopy. Indwelling catheter remains in place; not particularly bothered by it. Changed monthly by caretenders. Hoping to get catheter out eventually.     Update 9/23/20: Presents for follow up due to indwelling ramos issues. C/o leaking around catheter; no pain. Was exchanged by home health on Sunday due to catheter malfunction; leakage around penis 2-3 days prior. Continues to deny significant bother with catheter. Wonders if he should proceed surgery.     Update 10/28/2020: Patient presents for follow-up.  Catheter in place.  Denies issues catheter.  Changed last week.. No complaints today.     Update 3/26/2021: Patient presents for routine catheter exchange.  Has been thinking a great deal about options for management.  Becoming progressively more bothered by catheter.  Would like to proceed with surgery.    Update  "5/7/2021: Patient presents for postop follow-up after robotic simple prostatectomy.  Doing well.  Cystogram prior to appointment without leak.  Denies significant pain.  Denies constipation.  Has had decreased appetite in general since surgery.  No other complaints.  Catheter removed in office; able to void approximately 100 cc; continue to monitor    Update 5/26/2021: Patient presents for follow-up PVR.  Reports good strong stream.  No urinary complaints today.  Feels great.  Pleased with results of surgery.  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 Creatinine of 7 on presentation went down with catheter drainage. Had gone 3 days prior to presentation without urinating.  Workup in ER included CT of the abdomen and pelvis indicating severe bladder distention, bilateral hydronephrosis, and protatomegaly. Mathias catheter was placed in ER without difficulty yield >1300 cc of retained urine. Patient noted immediate relief of pain.     Denies h/o bph; did not take prostate medication. Placed on Flomax in hospital, has continued to take.   States he was voiding without issue prior to presentation; denies baseline weak stream, sensation of incomplete emptying, hesitancy, frequency, or straining.    Hoping to get catheter out, but willing to keep while w/u being performed.     Had renal u/s and bmp prior to visit. DESIREE with resolution of hydronephrosis.   Cr 3/20: 0.93  CT during hospitalization revealed >100gm prostate    Update 7/14/20: Presents for f/u after UDS and cystoscopy. Indwelling catheter remains in place; not particularly bothered by it. Changed monthly by caretenders. Hoping to get catheter out eventually.     Update 9/23/20: Presents for follow up due to " indwelling ramos issues. C/o leaking around catheter; no pain. Was exchanged by home health on Sunday due to catheter malfunction; leakage around penis 2-3 days prior. Continues to deny significant bother with catheter. Wonders if he should proceed surgery.     Update 10/28/2020: Patient presents for follow-up.  Catheter in place.  Denies issues catheter.  Changed last week.. No complaints today.     Update 3/26/2021: Patient presents for routine catheter exchange.  Has been thinking a great deal about options for management.  Becoming progressively more bothered by catheter.  Would like to proceed with surgery.    Update 5/7/2021: Patient presents for postop follow-up after robotic simple prostatectomy.  Doing well.  Cystogram prior to appointment without leak.  Denies significant pain.  Denies constipation.  Has had decreased appetite in general since surgery.  No other complaints.  Catheter removed in office; able to void approximately 100 cc; continue to monitor    Update 5/26/2021: Patient presents for follow-up PVR.  Reports good strong stream.  No urinary complaints today.  Feels great.  Pleased with results of surgery.  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; urge urinary incontinence; strong detrusor with weak flow       Past Medical History  Prostate Disorder; Sleep disorder         Past Surgical History  Colonoscopy; Tonsilectomy         Medication List  Colace 100 mg oral capsule; finasteride 5 mg oral tablet; oxybutynin chloride 10 mg oral tablet extended release 24hr; tamsulosin 0.4 mg oral capsule; trazodone 50 mg oral tablet         Allergy List  PENICILLINS       Allergies Reconciled  Family Medical History  Family history of breast cancer         Social History  Alcohol (Former); Tobacco (Former)         Review of Systems  · Constitutional  o Denies  o : fever, chills  · Gastrointestinal  o Denies  o : nausea,  "vomiting      Vitals  Date Time BP Position Site L\R Cuff Size HR RR TEMP (F) WT  HT  BMI kg/m2 BSA m2 O2 Sat FR L/min FiO2 HC       05/26/2021 11:19 AM       13  134lbs 0oz 5'  6\" 21.63 1.68             Physical Examination  · Constitutional  o Appearance  o : Well nourished, well developed patient in no acute distress.  · Eyes  o Conjunctivae  o : Conjunctivae normal  o Sclerae  o : Sclerae white  · Ears, Nose, Mouth and Throat  o Ears  o :   § Hearing  § : Intact to conversational voice both ears  § Ears  § : Normal  o Nose  o :   § External Nose  § : Appearance normal  · Skin and Subcutaneous Tissue  o General Inspection  o : No rashes, lesions, or areas of discoloration present  o General Palpation  o : Skin Turgor Normal  · Neurologic  o Mental Status Examination  o :   § Orientation  § : Alert and Oriented X3  o Gait and Station  o :   § Gait Screening  § : Ambulating without difficulty  · Psychiatric  o Mood and Affect  o : Mood normal, affect appropriate          Results  · In-Office Procedures  o Surgical procedure  § IOP - Bladder Scan/Residual Urine (86119)   § Specimen vol Ur: 59       Assessment  · BPH loc w urin obs/LUTS       Benign prostatic hyperplasia with lower urinary tract symptoms     600.21/N40.1  · Urinary retention     788.20/R33.9      Plan  · Medications  o Medications have been Reconciled  o Transition of Care or Provider Policy  · Instructions  o Electronically Identified Patient Education Materials Provided Electronically     Doing well; emptying without significant postvoid residual   Activity okay to be performed as tolerated at this point   Follow-up 3 months repeat PVR or earlier as needed  All questions addressed             Electronically Signed by: Rosalva Mireles MD -Author on May 26, 2021 12:20:23 PM  "

## 2021-06-06 NOTE — PROGRESS NOTES
Progress Note      Patient Name: Abimael Randall   Patient ID: [id number]   Sex: Male   YOB: 1940    Primary Care Provider: Kwame Mckeon MD   Referring Provider: Kwame Mckeon MD    Visit Date: May 7, 2021    Provider: Rosalva Mireles MD   Location: [facility]   Location Address: [address] [place] [address]  [id number]   Location Phone: [phone]          Chief Complaint  · Patient is here for urological symptoms      History Of Present Illness     1300 cc of retained urine. Patient noted immediate relief of pain.     Denies h/o bph; did not take prostate medication. Placed on Flomax in hospital, has continued to take.   States he was voiding without issue prior to presentation; denies baseline weak stream, sensation of incomplete emptying, hesitancy, frequency, or straining.    Hoping to get catheter out, but willing to keep while w/u being performed.     Had renal u/s and bmp prior to visit. DESIREE with resolution of hydronephrosis.   Cr 3/20: 0.93  CT during hospitalization revealed >100gm prostate    Update 7/14/20: Presents for f/u after UDS and cystoscopy. Indwelling catheter remains in place; not particularly bothered by it. Changed monthly by caretenders. Hoping to get catheter out eventually.     Update 9/23/20: Presents for follow up due to indwelling ramos issues. C/o leaking around catheter; no pain. Was exchanged by home health on Sunday due to catheter malfunction; leakage around penis 2-3 days prior. Continues to deny significant bother with catheter. Wonders if he should proceed surgery.     Update 10/28/2020: Patient presents for follow-up.  Catheter in place.  Denies issues catheter.  Changed last week.. No complaints today.     Update 3/26/2021: Patient presents for routine catheter exchange.  Has been thinking a great deal about options for management.  Becoming progressively more bothered by catheter.  Would like to proceed with surgery.    Update  "5/7/2021: Patient presents for postop follow-up after robotic simple prostatectomy.  Doing well.  Cystogram prior to appointment without leak.  Denies significant pain.  Denies constipation.  Has had decreased appetite in general since surgery.  No other complaints.  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 Creatinine of 7 on presentation went down with catheter drainage. Had gone 3 days prior to presentation without urinating.  Workup in ER included CT of the abdomen and pelvis indicating severe bladder distention, bilateral hydronephrosis, and protatomegaly. Ramos catheter was placed in ER without difficulty yield >1300 cc of retained urine. Patient noted immediate relief of pain.     Denies h/o bph; did not take prostate medication. Placed on Flomax in hospital, has continued to take.   States he was voiding without issue prior to presentation; denies baseline weak stream, sensation of incomplete emptying, hesitancy, frequency, or straining.    Hoping to get catheter out, but willing to keep while w/u being performed.     Had renal u/s and bmp prior to visit. DESIREE with resolution of hydronephrosis.   Cr 3/20: 0.93  CT during hospitalization revealed >100gm prostate    Update 7/14/20: Presents for f/u after UDS and cystoscopy. Indwelling catheter remains in place; not particularly bothered by it. Changed monthly by caretenders. Hoping to get catheter out eventually.     Update 9/23/20: Presents for follow up due to indwelling ramos issues. C/o leaking around catheter; no pain. Was exchanged by home health on Sunday due to catheter malfunction; leakage around penis 2-3 days prior. Continues " "to deny significant bother with catheter. Wonders if he should proceed surgery.     Update 10/28/2020: Patient presents for follow-up.  Catheter in place.  Denies issues catheter.  Changed last week.. No complaints today.     Update 3/26/2021: Patient presents for routine catheter exchange.  Has been thinking a great deal about options for management.  Becoming progressively more bothered by catheter.  Would like to proceed with surgery.    Update 5/7/2021: Patient presents for postop follow-up after robotic simple prostatectomy.  Doing well.  Cystogram prior to appointment without leak.  Denies significant pain.  Denies constipation.  Has had decreased appetite in general since surgery.  No other complaints.  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; urge urinary incontinence; strong detrusor with weak flow       Past Medical History  Prostate Disorder; Sleep disorder         Past Surgical History  Colonoscopy; Tonsilectomy         Medication List  Colace 100 mg oral capsule; finasteride 5 mg oral tablet; oxybutynin chloride 10 mg oral tablet extended release 24hr; tamsulosin 0.4 mg oral capsule; trazodone 50 mg oral tablet         Allergy List  PENICILLINS       Allergies Reconciled  Family Medical History  Family history of breast cancer         Social History  Tobacco (Former)         Review of Systems  · Constitutional  o Denies  o : fever, chills  · Gastrointestinal  o Denies  o : nausea, vomiting      Vitals  Date Time BP Position Site L\R Cuff Size HR RR TEMP (F) WT  HT  BMI kg/m2 BSA m2 O2 Sat FR L/min FiO2 HC       05/07/2021 11:02 AM       16   5'  11\"               Physical Examination  · Constitutional  o Appearance  o : Well nourished, well developed patient in no acute distress.  · Eyes  o Conjunctivae  o : Conjunctivae normal  o Sclerae  o : Sclerae " white  · Ears, Nose, Mouth and Throat  o Ears  o :   § Hearing  § : Intact to conversational voice both ears  § Ears  § : Normal  o Nose  o :   § External Nose  § : Appearance normal  · Skin and Subcutaneous Tissue  o General Inspection  o : No rashes, lesions, or areas of discoloration present  o General Palpation  o : Skin Turgor Normal  · Neurologic  o Mental Status Examination  o :   § Orientation  § : Alert and Oriented X3  o Gait and Station  o :   § Gait Screening  § : Ambulating without difficulty  · Psychiatric  o Mood and Affect  o : Mood normal, affect appropriate     Abdomen, soft, nondistended, nontender; incisions, clean, dry, intact               Assessment  · BPH loc w urin obs/LUTS       Benign prostatic hyperplasia with lower urinary tract symptoms     600.21/N40.1  · Urinary retention     788.20/R33.9    Problems Reconciled  Plan  · Medications  o Medications have been Reconciled  o Transition of Care or Provider Policy  · Instructions  o Electronically Identified Patient Education Materials Provided Electronically     Doing well status post robotic simple prostatectomy last week; cystogram reviewed; no evidence of leak.  Catheter removed and void trial performed; patient able to void approximately 100 cc.  Encourage patient to go home drink plenty of fluids and is to return to office prior to close of business for repeat post void residual bladder scan.    Provided reassurance; discussed postoperative expectations and management.  Doing very well.    Follow-up next week or earlier as needed  All questions addressed             Electronically Signed by: Rosalva Mireles MD -Author on May 7, 2021 11:44:37 AM

## 2021-07-15 VITALS — RESPIRATION RATE: 16 BRPM | HEIGHT: 71 IN | BODY MASS INDEX: 21.48 KG/M2

## 2021-07-15 VITALS — HEIGHT: 71 IN | RESPIRATION RATE: 12 BRPM | BODY MASS INDEX: 19.48 KG/M2 | WEIGHT: 139.12 LBS

## 2021-07-15 VITALS — HEIGHT: 66 IN | BODY MASS INDEX: 21.53 KG/M2 | WEIGHT: 134 LBS | RESPIRATION RATE: 13 BRPM

## 2021-08-05 ENCOUNTER — TELEPHONE (OUTPATIENT)
Dept: UROLOGY | Facility: CLINIC | Age: 81
End: 2021-08-05

## 2021-08-05 NOTE — TELEPHONE ENCOUNTER
Patient said had surgery 3 months ago.  He takes Oxybutynin 10 mg BID. He has bladder spasms.  He will go several days without having any, and then they return.  Last night he had 2 that were bad.  Asked to be called.

## 2021-08-06 NOTE — TELEPHONE ENCOUNTER
I spoke with patient, explained that he needed to continue to take oxybutynin and if spasms get worst, to call and let us know. If refills are needed, call and notify the office. Patient voiced understanding.

## 2021-08-24 PROBLEM — G47.9 SLEEP DISORDER: Status: ACTIVE | Noted: 2021-08-24

## 2021-08-24 PROBLEM — N42.9 PROSTATE DISORDER: Status: ACTIVE | Noted: 2021-08-24

## 2021-08-24 RX ORDER — OXYBUTYNIN CHLORIDE 10 MG/1
TABLET, EXTENDED RELEASE ORAL
COMMUNITY
Start: 2021-08-02 | End: 2021-09-03 | Stop reason: SDUPTHER

## 2021-08-24 RX ORDER — FINASTERIDE 5 MG/1
TABLET, FILM COATED ORAL
COMMUNITY
Start: 2021-06-02 | End: 2022-02-16 | Stop reason: SDUPTHER

## 2021-08-24 RX ORDER — TRAZODONE HYDROCHLORIDE 50 MG/1
TABLET ORAL
COMMUNITY
Start: 2021-08-02

## 2021-08-25 ENCOUNTER — OFFICE VISIT (OUTPATIENT)
Dept: UROLOGY | Facility: CLINIC | Age: 81
End: 2021-08-25

## 2021-08-25 VITALS — BODY MASS INDEX: 20.31 KG/M2 | HEIGHT: 66 IN | RESPIRATION RATE: 14 BRPM | WEIGHT: 126.4 LBS

## 2021-08-25 DIAGNOSIS — N40.0 BENIGN PROSTATIC HYPERPLASIA WITHOUT LOWER URINARY TRACT SYMPTOMS: ICD-10-CM

## 2021-08-25 DIAGNOSIS — N42.9 PROSTATE DISORDER: Primary | ICD-10-CM

## 2021-08-25 LAB — SCAN: NORMAL

## 2021-08-25 PROCEDURE — 51798 US URINE CAPACITY MEASURE: CPT | Performed by: UROLOGY

## 2021-08-25 PROCEDURE — 99212 OFFICE O/P EST SF 10 MIN: CPT | Performed by: UROLOGY

## 2021-08-26 NOTE — PROGRESS NOTES
"    UROLOGY OFFICE FOLLOW-UP NOTE    Subjective   HPI  Abimael Randall is a 81 y.o. male history of acute urinary retention secondary to BPH requiring Mathias catheter status post suprapubic prostatectomy, 4/2021: Patient reports doing very well.  States he is voiding with good strong stream.  Denies bothersome lower urinary tract symptoms.  No urologic complaints today.      Results for orders placed or performed in visit on 08/25/21   Bladder Scan   Result Value Ref Range    Scan 077ml          Medical History:  Past Medical History:   Diagnosis Date   • Prostate disorder    • Sleep disorder         Social History:  Social History     Socioeconomic History   • Marital status: Single     Spouse name: Not on file   • Number of children: Not on file   • Years of education: Not on file   • Highest education level: Not on file   Tobacco Use   • Smoking status: Former Smoker   • Smokeless tobacco: Never Used   Substance and Sexual Activity   • Alcohol use: Not Currently     Comment: 05/26/2021        Family History:  Family History   Problem Relation Age of Onset   • Breast cancer Sister         60s        Surgical History:  Past Surgical History:   Procedure Laterality Date   • CATARACT EXTRACTION, BILATERAL  2021        Allergies:  Allergies   Allergen Reactions   • Penicillins Other (See Comments)        Current Medications:  Current Outpatient Medications   Medication Sig Dispense Refill   • finasteride (PROSCAR) 5 MG tablet      • oxybutynin XL (DITROPAN-XL) 10 MG 24 hr tablet      • traZODone (DESYREL) 50 MG tablet        No current facility-administered medications for this visit.       Review of systems  Constitutional: Denies fever chills  GI: Denies nausea, vomiting    Objective     Vital Signs:   Resp 14   Ht 167.6 cm (65.98\")   Wt 57.3 kg (126 lb 6.4 oz)   BMI 20.41 kg/m²       Physical exam  No acute distress, thin   awake alert and oriented  Mood normal; affect normal    Bladder Scan interpretation " 08/25/2021    Estimation of residual urine via BVI [address] Bladder Scan  Residual Urine: 77 ml  Indication: Prostate disorder    Benign prostatic hyperplasia without lower urinary tract symptoms   Position: Supine  Examination: Incremental scanning of the suprapubic area using 2.0 MHz transducer using copious amounts of acoustic gel.   Findings: An anechoic area was demonstrated which represented the bladder, with measurement of residual urine as noted. I inspected this myself. In that the residual urine was stable or insignificant, refer to plan for treatment and plan necessary at this time.     Problem List:  Patient Active Problem List   Diagnosis   • Prostate disorder   • Sleep disorder       Assessment/Plan     Diagnoses and all orders for this visit:    1. Prostate disorder (Primary)  -     Bladder Scan    2. Benign prostatic hyperplasia without lower urinary tract symptoms         Doing well; emptying without significant postvoid residual   Activity as tolerated  Follow-up 6 months repeat PVR or earlier as needed  All questions addressed         Signed:  Rosalva Matos MD  08/25/21  22:21 EDT

## 2021-09-02 ENCOUNTER — TELEPHONE (OUTPATIENT)
Dept: UROLOGY | Facility: CLINIC | Age: 81
End: 2021-09-02

## 2021-09-02 DIAGNOSIS — N42.9 PROSTATE DISORDER: ICD-10-CM

## 2021-09-02 DIAGNOSIS — N40.0 BENIGN PROSTATIC HYPERPLASIA WITHOUT LOWER URINARY TRACT SYMPTOMS: Primary | ICD-10-CM

## 2021-09-02 NOTE — TELEPHONE ENCOUNTER
Patient asked for refills on Oxybutyin 10 mg BID 90-day refills.  Pharmacy in chart is correct.  Please call when sent.

## 2021-09-03 RX ORDER — OXYBUTYNIN CHLORIDE 10 MG/1
10 TABLET, EXTENDED RELEASE ORAL DAILY
Qty: 30 TABLET | Refills: 2 | Status: SHIPPED | OUTPATIENT
Start: 2021-09-03 | End: 2021-12-02

## 2021-09-03 NOTE — TELEPHONE ENCOUNTER
PER [facility]ANDO PATIENT CALLED BACK STATING HE'S OUT OF OXYBUTYNIN AND WOULD NEED A REFILL TODAY. PHARMACY TOLD PT THEY HAVEN'T BEEN ABLE TO REACH US TO RENEW SCRIPT. PLEASE SEND TO [facility] [id number].

## 2021-10-05 ENCOUNTER — TELEPHONE (OUTPATIENT)
Dept: UROLOGY | Facility: CLINIC | Age: 81
End: 2021-10-05

## 2021-10-05 NOTE — TELEPHONE ENCOUNTER
Pt states that he has a bulge in his pubic area about the size of the palm of his hand. There is no pain but he thinks it may be a recurring hernia and that Dr. Matos did his last hernia repair when she did prostate surgery. Should he be seen for this.

## 2021-10-06 NOTE — TELEPHONE ENCOUNTER
Spoke to pt, relayed message. Pt says it is not giving him any issues at all. Pt says if it starts to bother him, we will call back to let us know and maybe we can see him sooner than his already scheduled Feb 2022 appt.

## 2022-02-16 DIAGNOSIS — N40.0 BENIGN PROSTATIC HYPERPLASIA WITHOUT LOWER URINARY TRACT SYMPTOMS: Primary | ICD-10-CM

## 2022-02-16 RX ORDER — FINASTERIDE 5 MG/1
5 TABLET, FILM COATED ORAL DAILY
Qty: 90 TABLET | Refills: 1 | Status: SHIPPED | OUTPATIENT
Start: 2022-02-16 | End: 2022-05-17

## 2022-02-16 RX ORDER — TAMSULOSIN HYDROCHLORIDE 0.4 MG/1
1 CAPSULE ORAL DAILY
Qty: 90 CAPSULE | Refills: 4 | Status: SHIPPED | OUTPATIENT
Start: 2022-02-16

## 2022-02-28 PROBLEM — N40.0 BPH (BENIGN PROSTATIC HYPERPLASIA): Status: ACTIVE | Noted: 2022-02-28

## 2022-03-04 ENCOUNTER — OFFICE VISIT (OUTPATIENT)
Dept: UROLOGY | Facility: CLINIC | Age: 82
End: 2022-03-04

## 2022-03-04 VITALS — WEIGHT: 125 LBS | HEIGHT: 66 IN | BODY MASS INDEX: 20.09 KG/M2

## 2022-03-04 DIAGNOSIS — N40.0 BENIGN PROSTATIC HYPERPLASIA WITHOUT LOWER URINARY TRACT SYMPTOMS: Primary | ICD-10-CM

## 2022-03-04 DIAGNOSIS — R39.15 URINARY URGENCY: ICD-10-CM

## 2022-03-04 DIAGNOSIS — N32.89 BLADDER SPASMS: ICD-10-CM

## 2022-03-04 PROCEDURE — 99214 OFFICE O/P EST MOD 30 MIN: CPT | Performed by: UROLOGY

## 2022-03-04 PROCEDURE — 51798 US URINE CAPACITY MEASURE: CPT | Performed by: UROLOGY

## 2022-03-04 RX ORDER — OXYBUTYNIN CHLORIDE 5 MG/1
5 TABLET, EXTENDED RELEASE ORAL DAILY
Qty: 90 TABLET | Refills: 1 | Status: SHIPPED | OUTPATIENT
Start: 2022-03-04 | End: 2022-09-09

## 2022-03-04 RX ORDER — OXYBUTYNIN CHLORIDE 5 MG/1
5 TABLET ORAL EVERY 8 HOURS PRN
Qty: 60 TABLET | Refills: 2 | Status: SHIPPED | OUTPATIENT
Start: 2022-03-04

## 2022-03-04 NOTE — PROGRESS NOTES
UROLOGY OFFICE follow-up NOTE    Subjective   HPI  Abimael Randall is a 81 y.o. male history of acute urinary retention secondary to BPH requiring Mathias catheter status post suprapubic prostatectomy, 4/2021.  Presents for routine follow-up, postvoid residual bladder scan.  Patient reports doing very well.  States he is voiding with good strong stream.  Denies bothersome lower urinary tract symptoms.   Has an occasional bladder spasm, taking oxybutynin 5 mg immediate release as needed; would like better sustained relief.  Voiding without adverse side effect.      Medical History:  Past Medical History:   Diagnosis Date   • BPH (benign prostatic hyperplasia) 2/28/2022   • Prostate disorder    • Sleep disorder         Social History:  Social History     Socioeconomic History   • Marital status: Single   Tobacco Use   • Smoking status: Former Smoker   • Smokeless tobacco: Never Used   Substance and Sexual Activity   • Alcohol use: Not Currently     Comment: 05/26/2021        Family History:  Family History   Problem Relation Age of Onset   • Breast cancer Sister         60s        Surgical History:  Past Surgical History:   Procedure Laterality Date   • CATARACT EXTRACTION, BILATERAL  2021        Allergies:  Allergies   Allergen Reactions   • Amoxicillin Other (See Comments)   • Penicillins Other (See Comments)        Current Medications:  Current Outpatient Medications   Medication Sig Dispense Refill   • finasteride (PROSCAR) 5 MG tablet Take 1 tablet by mouth Daily for 90 days. 90 tablet 1   • oxybutynin (DITROPAN) 5 MG tablet Take 1 tablet by mouth Every 8 (Eight) Hours As Needed (Bladder spasms). 60 tablet 2   • tamsulosin (FLOMAX) 0.4 MG capsule 24 hr capsule Take 1 capsule by mouth Daily. 90 capsule 4   • traZODone (DESYREL) 50 MG tablet      • oxybutynin XL (DITROPAN-XL) 5 MG 24 hr tablet Take 1 tablet by mouth Daily. 90 tablet 1     No current facility-administered medications for this visit.  "      Review of systems  Constitutional: Denies fever chills  GI: Denies nausea, vomiting    Objective     Vital Signs:   Ht 167.6 cm (65.98\")   Wt 56.7 kg (125 lb)   BMI 20.19 kg/m²       Physical exam  No acute distress, well-nourished  Awake alert and oriented  Mood normal; affect normal    Bladder Scan interpretation 03/04/2022    Estimation of residual urine via BVI [address] Bladder Scan  Residual Urine: 0 ml  Indication: Benign prostatic hyperplasia without lower urinary tract symptoms    Urinary urgency    Bladder spasms   Position: Supine  Examination: Incremental scanning of the suprapubic area using 2.0 MHz transducer using copious amounts of acoustic gel.   Findings: An anechoic area was demonstrated which represented the bladder, with measurement of residual urine as noted. I inspected this myself. In that the residual urine was stable or insignificant, refer to plan for treatment and plan necessary at this time.     Problem List:  Patient Active Problem List   Diagnosis   • Sleep disorder   • BPH (benign prostatic hyperplasia)       Assessment/Plan   Diagnoses and all orders for this visit:    1. Benign prostatic hyperplasia without lower urinary tract symptoms (Primary)    2. Urinary urgency  -     oxybutynin XL (DITROPAN-XL) 5 MG 24 hr tablet; Take 1 tablet by mouth Daily.  Dispense: 90 tablet; Refill: 1    3. Bladder spasms  -     oxybutynin (DITROPAN) 5 MG tablet; Take 1 tablet by mouth Every 8 (Eight) Hours As Needed (Bladder spasms).  Dispense: 60 tablet; Refill: 2      Overall, continues to do very well.  Continues to void with insignificant postvoid residual bladder scan.    Refill of oxybutynin as needed, 5 mg IR provided  Trial of oxybutynin 5 mg ER daily sent to pharmacy; side effects discussed including constipation and dry mouth    Follow-up 6 months, PVR or earlier as needed  All questions addressed         Signed:  Rosalva Matos MD  03/06/22  15:36 EST      MDM moderate: 2 " stable chronic illnesses; prescription drug management

## 2022-09-09 DIAGNOSIS — R39.15 URINARY URGENCY: ICD-10-CM

## 2022-09-09 PROBLEM — H43.813 POSTERIOR VITREOUS DETACHMENT OF BOTH EYES: Status: ACTIVE | Noted: 2020-06-23

## 2022-09-09 PROBLEM — Z96.1 ARTIFICIAL LENS PRESENT: Status: ACTIVE | Noted: 2020-07-24

## 2022-09-09 RX ORDER — OXYBUTYNIN CHLORIDE 5 MG/1
5 TABLET, EXTENDED RELEASE ORAL DAILY
Qty: 90 TABLET | Refills: 1 | Status: SHIPPED | OUTPATIENT
Start: 2022-09-09 | End: 2023-03-06

## 2022-09-13 ENCOUNTER — OFFICE VISIT (OUTPATIENT)
Dept: UROLOGY | Facility: CLINIC | Age: 82
End: 2022-09-13

## 2022-09-13 VITALS — BODY MASS INDEX: 24.17 KG/M2 | WEIGHT: 150.4 LBS | HEIGHT: 66 IN | RESPIRATION RATE: 14 BRPM

## 2022-09-13 DIAGNOSIS — R39.15 URINARY URGENCY: Primary | ICD-10-CM

## 2022-09-13 DIAGNOSIS — N40.0 BENIGN PROSTATIC HYPERPLASIA WITHOUT LOWER URINARY TRACT SYMPTOMS: ICD-10-CM

## 2022-09-13 DIAGNOSIS — N32.89 BLADDER SPASMS: ICD-10-CM

## 2022-09-13 LAB — URINE VOLUME: 1

## 2022-09-13 PROCEDURE — 99213 OFFICE O/P EST LOW 20 MIN: CPT | Performed by: UROLOGY

## 2022-09-13 PROCEDURE — 51798 US URINE CAPACITY MEASURE: CPT | Performed by: UROLOGY

## 2022-09-13 RX ORDER — FINASTERIDE 5 MG/1
5 TABLET, FILM COATED ORAL DAILY
Qty: 90 TABLET | Refills: 3 | Status: SHIPPED | OUTPATIENT
Start: 2022-09-13

## 2022-09-13 NOTE — PROGRESS NOTES
"    UROLOGY OFFICE follow up NOTE    Subjective   HPI  Abimael Randall is a 82 y.o. male.  History of acute urinary retention secondary to BPH requiring Mathias catheter status post suprapubic prostatectomy, 4/2021.  Presents for routine follow-up, postvoid residual bladder scan.  Patient reports doing very well.  States he is voiding with good strong stream.  Denies bothersome lower urinary tract symptoms.   Has an occasional bladder spasm, taking oxybutynin 5 mg immediate release as needed; would like better sustained relief.  Voiding without adverse side effect.    Update 9/13/2022: The patient reports that he is doing well. He states that he has a good stream and is emptying his bladder well. He states that he takes oxybutynin 5 mg every morning and 10 mg if he has a bladder spasm. The patient reports that he still has frequent small bladder spasms and occasionally they are severe. The patient reports that he drinks 2 to 2.5 cups of coffee per day.  The patient reports that he has a small hernia. He states that it does not bother him. He states that he noticed it 4 to 5 days ago.         Results for orders placed or performed in visit on 09/13/22   Bladder Scan   Result Value Ref Range    Urine Volume 1        Review of systems  A review of systems was performed, and positive findings are noted in the HPI.    Objective     Vital Signs:   Resp 14   Ht 167.6 cm (65.98\")   Wt 68.2 kg (150 lb 6.4 oz)   BMI 24.29 kg/m²       Physical exam  No acute distress, well-nourished  Awake alert and oriented  Mood normal; affect normal  Abdomen: Anterior abdominal wall hernia approximately 2 cm; nontender; at prior extraction site incision    Bladder Scan interpretation 09/13/2022    Estimation of residual urine via BVI [address] Bladder Scan  Performed by: Yadi Jefferson RN  Residual Urine: 1 ml  Indication: Urinary urgency    Benign prostatic hyperplasia without lower urinary tract symptoms    Bladder spasms "   Position: Supine  Examination: Incremental scanning of the suprapubic area using 2.0 MHz transducer using copious amounts of acoustic gel.   Findings: An anechoic area was demonstrated which represented the bladder, with measurement of residual urine as noted. I inspected this myself. In that the residual urine was stable or insignificant, refer to plan for treatment and plan necessary at this time.     Problem List:  Patient Active Problem List   Diagnosis   • Sleep disorder   • BPH (benign prostatic hyperplasia)   • Artificial lens present   • Posterior vitreous detachment of both eyes       Assessment & Plan   Diagnoses and all orders for this visit:    1. Urinary urgency (Primary)  -     Bladder Scan    2. Benign prostatic hyperplasia without lower urinary tract symptoms  -     Bladder Scan  -     finasteride (PROSCAR) 5 MG tablet; Take 1 tablet by mouth Daily.  Dispense: 90 tablet; Refill: 3    3. Bladder spasms  -     Bladder Scan  - The patient will continue to take oxybutynin 10 mg daily. I advised the patient to cut down on his coffee intake.  - He will continue finasteride 5 mg daily.  - On abdominal exam, he has a small midline hernia at the inferior portion of his incision. We will monitor this; not currently bothered.        Patient encouraged to follow-up 6 months, PVR.  All questions addressed    Transcribed from ambient dictation for Rosalva Matos MD by Adrian Issa.  09/13/22   13:57 EDT    Patient verbalized consent to the visit recording.

## 2023-03-04 DIAGNOSIS — R39.15 URINARY URGENCY: ICD-10-CM

## 2023-03-06 RX ORDER — OXYBUTYNIN CHLORIDE 5 MG/1
5 TABLET, EXTENDED RELEASE ORAL DAILY
Qty: 90 TABLET | Refills: 1 | Status: SHIPPED | OUTPATIENT
Start: 2023-03-06

## 2023-03-14 ENCOUNTER — TELEPHONE (OUTPATIENT)
Dept: UROLOGY | Facility: CLINIC | Age: 83
End: 2023-03-14

## 2023-05-09 DIAGNOSIS — N40.0 BENIGN PROSTATIC HYPERPLASIA WITHOUT LOWER URINARY TRACT SYMPTOMS: ICD-10-CM

## 2023-05-09 RX ORDER — TAMSULOSIN HYDROCHLORIDE 0.4 MG/1
1 CAPSULE ORAL DAILY
Qty: 90 CAPSULE | Refills: 4 | Status: SHIPPED | OUTPATIENT
Start: 2023-05-09

## 2023-06-15 ENCOUNTER — OFFICE VISIT (OUTPATIENT)
Dept: UROLOGY | Facility: CLINIC | Age: 83
End: 2023-06-15
Payer: COMMERCIAL

## 2023-06-15 VITALS — WEIGHT: 136 LBS | BODY MASS INDEX: 22.66 KG/M2 | HEIGHT: 65 IN | RESPIRATION RATE: 16 BRPM

## 2023-06-15 DIAGNOSIS — N40.0 BENIGN PROSTATIC HYPERPLASIA WITHOUT LOWER URINARY TRACT SYMPTOMS: Primary | ICD-10-CM

## 2023-06-15 DIAGNOSIS — N32.89 BLADDER SPASMS: ICD-10-CM

## 2023-06-15 LAB — SPECIMEN VOL 24H UR: 114 L

## 2023-06-16 NOTE — PROGRESS NOTES
"    UROLOGY OFFICE follow-up NOTE    Subjective   HPI  Abimael Randall is a 82 y.o. male. History of acute urinary retention secondary to BPH requiring Mathias catheter status post suprapubic prostatectomy, 4/2021.  Presents for routine follow-up, postvoid residual bladder scan.   Patient states he has continued to do well.  Continues to take oxybutynin on occasion for bladder spasms.  States he does not need refills for this.  Reports voiding with good strong stream; no urinary complaints.  No UTI since last visit.        Results for orders placed or performed in visit on 06/15/23   Bladder Scan   Result Value Ref Range    Volume 114        Review of systems  A review of systems was performed, and positive findings are noted in the HPI.    Objective     Vital Signs:   Resp 16   Ht 165.1 cm (65\")   Wt 61.7 kg (136 lb)   BMI 22.63 kg/m²       Physical exam  No acute distress, well-nourished  Awake alert and oriented  Mood normal; affect normal    Bladder Scan interpretation 06/15/2023    Estimation of residual urine via BVI [address] Bladder Scan  Performed by: Yadi Jefferson RN  Residual Urine: 115 ml  Indication: Benign prostatic hyperplasia without lower urinary tract symptoms    Bladder spasms   Position: Supine  Examination: Incremental scanning of the suprapubic area using 2.0 MHz transducer using copious amounts of acoustic gel.   Findings: An anechoic area was demonstrated which represented the bladder, with measurement of residual urine as noted. I inspected this myself. In that the residual urine was stable , refer to plan for treatment and plan necessary at this time.     Problem List:  Patient Active Problem List   Diagnosis    Sleep disorder    BPH (benign prostatic hyperplasia)    Artificial lens present    Posterior vitreous detachment of both eyes       Assessment & Plan   Diagnoses and all orders for this visit:    1. Benign prostatic hyperplasia without lower urinary tract symptoms " (Primary)  -     Bladder Scan    2. Bladder spasms      Doing well  Adequately emptying bladder with acceptable postvoid residual; continue to monitor  Continue oxybutynin as needed for spasms; patient states he does not need refills at this time.  Encouraged to call should he need refill.  Continue finasteride    Follow-up 1 year, PVR or earlier as needed

## 2023-09-06 DIAGNOSIS — R39.15 URINARY URGENCY: ICD-10-CM

## 2023-09-06 RX ORDER — OXYBUTYNIN CHLORIDE 5 MG/1
5 TABLET, EXTENDED RELEASE ORAL DAILY
Qty: 90 TABLET | Refills: 1 | Status: SHIPPED | OUTPATIENT
Start: 2023-09-06

## 2023-09-08 DIAGNOSIS — N40.0 BENIGN PROSTATIC HYPERPLASIA WITHOUT LOWER URINARY TRACT SYMPTOMS: ICD-10-CM

## 2023-09-08 RX ORDER — FINASTERIDE 5 MG/1
5 TABLET, FILM COATED ORAL DAILY
Qty: 90 TABLET | Refills: 3 | Status: SHIPPED | OUTPATIENT
Start: 2023-09-08

## 2023-11-09 ENCOUNTER — APPOINTMENT (OUTPATIENT)
Dept: CT IMAGING | Facility: HOSPITAL | Age: 83
End: 2023-11-09
Payer: COMMERCIAL

## 2023-11-09 ENCOUNTER — APPOINTMENT (OUTPATIENT)
Dept: ULTRASOUND IMAGING | Facility: HOSPITAL | Age: 83
End: 2023-11-09
Payer: COMMERCIAL

## 2023-11-09 ENCOUNTER — HOSPITAL ENCOUNTER (EMERGENCY)
Facility: HOSPITAL | Age: 83
Discharge: HOME | End: 2023-11-09
Attending: EMERGENCY MEDICINE
Payer: COMMERCIAL

## 2023-11-09 ENCOUNTER — APPOINTMENT (OUTPATIENT)
Dept: GENERAL RADIOLOGY | Facility: HOSPITAL | Age: 83
End: 2023-11-09
Payer: COMMERCIAL

## 2023-11-09 VITALS
RESPIRATION RATE: 15 BRPM | TEMPERATURE: 98.7 F | OXYGEN SATURATION: 99 % | WEIGHT: 137.35 LBS | HEIGHT: 66 IN | DIASTOLIC BLOOD PRESSURE: 48 MMHG | SYSTOLIC BLOOD PRESSURE: 143 MMHG | BODY MASS INDEX: 22.07 KG/M2 | HEART RATE: 63 BPM

## 2023-11-09 DIAGNOSIS — K80.20 GALLSTONES: ICD-10-CM

## 2023-11-09 DIAGNOSIS — K44.9 HIATAL HERNIA: ICD-10-CM

## 2023-11-09 DIAGNOSIS — R93.5 ABNORMAL US (ULTRASOUND) OF ABDOMEN: ICD-10-CM

## 2023-11-09 DIAGNOSIS — R91.1 NODULE OF LOWER LOBE OF LEFT LUNG: ICD-10-CM

## 2023-11-09 DIAGNOSIS — R73.9 ELEVATED BLOOD SUGAR: ICD-10-CM

## 2023-11-09 DIAGNOSIS — R10.13 EPIGASTRIC PAIN: Primary | ICD-10-CM

## 2023-11-09 LAB
ALBUMIN SERPL-MCNC: 4.1 G/DL (ref 3.5–5.2)
ALBUMIN/GLOB SERPL: 2.1 G/DL
ALP SERPL-CCNC: 49 U/L (ref 39–117)
ALT SERPL W P-5'-P-CCNC: 14 U/L (ref 1–41)
ANION GAP SERPL CALCULATED.3IONS-SCNC: 9.4 MMOL/L (ref 5–15)
AST SERPL-CCNC: 16 U/L (ref 1–40)
BASOPHILS # BLD AUTO: 0.03 10*3/MM3 (ref 0–0.2)
BASOPHILS NFR BLD AUTO: 0.4 % (ref 0–1.5)
BILIRUB SERPL-MCNC: 0.4 MG/DL (ref 0–1.2)
BILIRUB UR QL STRIP: NEGATIVE
BUN SERPL-MCNC: 18 MG/DL (ref 8–23)
BUN/CREAT SERPL: 18 (ref 7–25)
CALCIUM SPEC-SCNC: 9.7 MG/DL (ref 8.6–10.5)
CHLORIDE SERPL-SCNC: 99 MMOL/L (ref 98–107)
CLARITY UR: ABNORMAL
CO2 SERPL-SCNC: 30.6 MMOL/L (ref 22–29)
COLOR UR: YELLOW
CREAT SERPL-MCNC: 1 MG/DL (ref 0.76–1.27)
D-LACTATE SERPL-SCNC: 1.2 MMOL/L (ref 0.5–2)
DEPRECATED RDW RBC AUTO: 44.3 FL (ref 37–54)
EGFRCR SERPLBLD CKD-EPI 2021: 74.7 ML/MIN/1.73
EOSINOPHIL # BLD AUTO: 0.01 10*3/MM3 (ref 0–0.4)
EOSINOPHIL NFR BLD AUTO: 0.1 % (ref 0.3–6.2)
ERYTHROCYTE [DISTWIDTH] IN BLOOD BY AUTOMATED COUNT: 13.3 % (ref 12.3–15.4)
GLOBULIN UR ELPH-MCNC: 2 GM/DL
GLUCOSE SERPL-MCNC: 166 MG/DL (ref 65–99)
GLUCOSE UR STRIP-MCNC: NEGATIVE MG/DL
HCT VFR BLD AUTO: 38.3 % (ref 37.5–51)
HGB BLD-MCNC: 12.6 G/DL (ref 13–17.7)
HGB UR QL STRIP.AUTO: NEGATIVE
HOLD SPECIMEN: NORMAL
HOLD SPECIMEN: NORMAL
IMM GRANULOCYTES # BLD AUTO: 0.02 10*3/MM3 (ref 0–0.05)
IMM GRANULOCYTES NFR BLD AUTO: 0.2 % (ref 0–0.5)
KETONES UR QL STRIP: NEGATIVE
LEUKOCYTE ESTERASE UR QL STRIP.AUTO: NEGATIVE
LIPASE SERPL-CCNC: 28 U/L (ref 13–60)
LYMPHOCYTES # BLD AUTO: 0.38 10*3/MM3 (ref 0.7–3.1)
LYMPHOCYTES NFR BLD AUTO: 4.5 % (ref 19.6–45.3)
MCH RBC QN AUTO: 29.5 PG (ref 26.6–33)
MCHC RBC AUTO-ENTMCNC: 32.9 G/DL (ref 31.5–35.7)
MCV RBC AUTO: 89.7 FL (ref 79–97)
MONOCYTES # BLD AUTO: 0.42 10*3/MM3 (ref 0.1–0.9)
MONOCYTES NFR BLD AUTO: 5 % (ref 5–12)
NEUTROPHILS NFR BLD AUTO: 7.53 10*3/MM3 (ref 1.7–7)
NEUTROPHILS NFR BLD AUTO: 89.8 % (ref 42.7–76)
NITRITE UR QL STRIP: NEGATIVE
NRBC BLD AUTO-RTO: 0 /100 WBC (ref 0–0.2)
PH UR STRIP.AUTO: >=9 [PH] (ref 5–8)
PLATELET # BLD AUTO: 194 10*3/MM3 (ref 140–450)
PMV BLD AUTO: 9.4 FL (ref 6–12)
POTASSIUM SERPL-SCNC: 3.8 MMOL/L (ref 3.5–5.2)
PROT SERPL-MCNC: 6.1 G/DL (ref 6–8.5)
PROT UR QL STRIP: NEGATIVE
RBC # BLD AUTO: 4.27 10*6/MM3 (ref 4.14–5.8)
SODIUM SERPL-SCNC: 139 MMOL/L (ref 136–145)
SP GR UR STRIP: >=1.03 (ref 1–1.03)
TROPONIN T SERPL HS-MCNC: 17 NG/L
UROBILINOGEN UR QL STRIP: ABNORMAL
WBC NRBC COR # BLD: 8.39 10*3/MM3 (ref 3.4–10.8)
WHOLE BLOOD HOLD COAG: NORMAL
WHOLE BLOOD HOLD SPECIMEN: NORMAL

## 2023-11-09 PROCEDURE — 93010 ELECTROCARDIOGRAM REPORT: CPT | Performed by: INTERNAL MEDICINE

## 2023-11-09 PROCEDURE — 93005 ELECTROCARDIOGRAM TRACING: CPT | Performed by: NURSE PRACTITIONER

## 2023-11-09 PROCEDURE — 71045 X-RAY EXAM CHEST 1 VIEW: CPT

## 2023-11-09 PROCEDURE — 81003 URINALYSIS AUTO W/O SCOPE: CPT | Performed by: EMERGENCY MEDICINE

## 2023-11-09 PROCEDURE — 99285 EMERGENCY DEPT VISIT HI MDM: CPT

## 2023-11-09 PROCEDURE — 84484 ASSAY OF TROPONIN QUANT: CPT | Performed by: NURSE PRACTITIONER

## 2023-11-09 PROCEDURE — 96374 THER/PROPH/DIAG INJ IV PUSH: CPT

## 2023-11-09 PROCEDURE — 83605 ASSAY OF LACTIC ACID: CPT | Performed by: EMERGENCY MEDICINE

## 2023-11-09 PROCEDURE — 83690 ASSAY OF LIPASE: CPT | Performed by: EMERGENCY MEDICINE

## 2023-11-09 PROCEDURE — 80053 COMPREHEN METABOLIC PANEL: CPT | Performed by: EMERGENCY MEDICINE

## 2023-11-09 PROCEDURE — 25510000001 IOPAMIDOL PER 1 ML: Performed by: EMERGENCY MEDICINE

## 2023-11-09 PROCEDURE — 36415 COLL VENOUS BLD VENIPUNCTURE: CPT

## 2023-11-09 PROCEDURE — 76705 ECHO EXAM OF ABDOMEN: CPT

## 2023-11-09 PROCEDURE — 74177 CT ABD & PELVIS W/CONTRAST: CPT

## 2023-11-09 PROCEDURE — 85025 COMPLETE CBC W/AUTO DIFF WBC: CPT | Performed by: EMERGENCY MEDICINE

## 2023-11-09 RX ORDER — FAMOTIDINE 20 MG/1
20 TABLET, FILM COATED ORAL 2 TIMES DAILY
Qty: 14 TABLET | Refills: 0 | Status: SHIPPED | OUTPATIENT
Start: 2023-11-09 | End: 2023-11-09 | Stop reason: SDUPTHER

## 2023-11-09 RX ORDER — SODIUM CHLORIDE 0.9 % (FLUSH) 0.9 %
10 SYRINGE (ML) INJECTION AS NEEDED
Status: DISCONTINUED | OUTPATIENT
Start: 2023-11-09 | End: 2023-11-09 | Stop reason: HOSPADM

## 2023-11-09 RX ORDER — FAMOTIDINE 20 MG/1
20 TABLET, FILM COATED ORAL 2 TIMES DAILY
Qty: 14 TABLET | Refills: 0 | Status: SHIPPED | OUTPATIENT
Start: 2023-11-09

## 2023-11-09 RX ORDER — FAMOTIDINE 10 MG/ML
20 INJECTION, SOLUTION INTRAVENOUS ONCE
Status: COMPLETED | OUTPATIENT
Start: 2023-11-09 | End: 2023-11-09

## 2023-11-09 RX ADMIN — IOPAMIDOL 100 ML: 755 INJECTION, SOLUTION INTRAVENOUS at 09:07

## 2023-11-09 RX ADMIN — FAMOTIDINE 20 MG: 10 INJECTION INTRAVENOUS at 08:55

## 2023-11-09 NOTE — ED PROVIDER NOTES
izabella: 7:20 AM EST  Date of encounter:  11/9/2023  Independent Historian/Clinical History and Information was obtained by:   Patient          Chief Complaint: epigastric pain        History is limited by: N/A          History of Present Illness:  Patient is a 83 y.o. year old male with history of prostate issues and cataract surgery who presents to the emergency department for evaluation of epigastric abdominal pain that started yesterday from time after he ate chicken and potato wedges.  He feels like his abdomen is bloated.  He denies fever, cough, chest pain or shortness of breath, diaphoresis, dysuria, hematuria, bloody stools, nausea, vomiting, constipation with diarrhea, testicular pain or swelling, or other complaint.  He denies smoking or alcohol use.              Patient Care Team  Primary Care Provider: Kwame Mckeon MD    Past Medical History:     Allergies   Allergen Reactions    Amoxicillin Other (See Comments)    Penicillins Other (See Comments)     Past Medical History:   Diagnosis Date    BPH (benign prostatic hyperplasia) 2/28/2022    Prostate disorder     Sleep disorder      Past Surgical History:   Procedure Laterality Date    CATARACT EXTRACTION, BILATERAL  2021     Family History   Problem Relation Age of Onset    Breast cancer Sister         60s       Home Medications:  Prior to Admission medications    Medication Sig Start Date End Date Taking? Authorizing Provider   finasteride (PROSCAR) 5 MG tablet Take 1 tablet by mouth Daily. 9/8/23   Rosalva Matos MD   oxybutynin (DITROPAN) 5 MG tablet Take 1 tablet by mouth Every 8 (Eight) Hours As Needed (Bladder spasms).  Patient not taking: Reported on 6/15/2023 3/4/22   Rosalva Matos MD   oxybutynin XL (DITROPAN-XL) 5 MG 24 hr tablet TAKE 1 TABLET BY MOUTH DAILY 9/6/23   Rosalva Matos MD   tamsulosin (FLOMAX) 0.4 MG capsule 24 hr capsule TAKE 1 CAPSULE BY MOUTH DAILY 5/9/23   Rosalva Matos MD   traZODone (DESYREL) 50 MG tablet  " 8/2/21   Provider, Polina, MD        Social History:   Social History     Tobacco Use    Smoking status: Former    Smokeless tobacco: Never   Vaping Use    Vaping Use: Never used   Substance Use Topics    Alcohol use: Not Currently     Comment: 05/26/2021         Physical Exam:  /48 (Patient Position: Sitting)   Pulse 63   Temp 98.7 °F (37.1 °C) (Oral)   Resp 15   Ht 167.6 cm (66\")   Wt 62.3 kg (137 lb 5.6 oz)   SpO2 99%   BMI 22.17 kg/m²     Physical Exam  Constitutional:       Appearance: Normal appearance.   HENT:      Head: Normocephalic and atraumatic.   Cardiovascular:      Rate and Rhythm: Normal rate and regular rhythm.      Pulses: Normal pulses.      Heart sounds: Normal heart sounds.   Pulmonary:      Effort: Pulmonary effort is normal.      Breath sounds: Normal breath sounds.   Abdominal:      General: Bowel sounds are normal.      Palpations: Abdomen is soft.      Comments: Unable to reproduce pain    Umbilical hernia noted   Musculoskeletal:         General: Normal range of motion.   Skin:     General: Skin is warm and dry.   Neurological:      General: No focal deficit present.      Mental Status: He is alert and oriented to person, place, and time.   Psychiatric:         Mood and Affect: Mood normal.         Behavior: Behavior normal.         Thought Content: Thought content normal.         Judgment: Judgment normal.                        Comorbidities that affect care:    None    External Notes reviewed:    None      The following orders were placed and all results were independently analyzed by me:  Orders Placed This Encounter   Procedures    XR Chest 1 View    CT Abdomen Pelvis With Contrast    US Gallbladder    [place] Draw    Comprehensive Metabolic Panel    Lipase    Urinalysis With Microscopic If Indicated (No Culture) - Urine, Clean Catch    Lactic Acid, Plasma    CBC Auto Differential    Single High Sensitivity Troponin T    NPO Diet NPO Type: Strict NPO    Undress & " Gown    Cardiac monitoring  Misc Nursing Order (Specify)    ECG 12 Lead Chest Pain    Insert Peripheral IV    CBC & Differential    Green Top (Gel)    Lavender Top    Gold Top - SST    Light Blue Top       Medications Given in the Emergency Department:  Medications   sodium chloride 0.9 % flush 10 mL (has no administration in time range)   famotidine (PEPCID) injection 20 mg (20 mg Intravenous Given 11/9/23 0855)   iopamidol (ISOVUE-370) 76 % injection 100 mL (100 mL Intravenous Given 11/9/23 0907)                  Labs:    Lab Results (last 24 hours)       Procedure Component Value Units Date/Time    CBC & Differential [[id number]]  (Abnormal) Collected: 11/09/23 0726    Specimen: Blood Updated: 11/09/23 0733    Narrative:      The following orders were created for panel order CBC & Differential.  Procedure                               Abnormality         Status                     ---------                               -----------         ------                     CBC Auto Differential[[id number]]        Abnormal            Final result                 Please view results for these tests on the individual orders.    Lipase [[id number]]  (Normal) Collected: 11/09/23 0726    Specimen: Blood Updated: 11/09/23 0755     Lipase 28 U/L     Lactic Acid, Plasma [[id number]]  (Normal) Collected: 11/09/23 0726    Specimen: Blood Updated: 11/09/23 0752     Lactate 1.2 mmol/L     CBC Auto Differential [[id number]]  (Abnormal) Collected: 11/09/23 0726    Specimen: Blood Updated: 11/09/23 0733     WBC 8.39 10*3/mm3      RBC 4.27 10*6/mm3      Hemoglobin 12.6 g/dL      Hematocrit 38.3 %      MCV 89.7 fL      MCH 29.5 pg      MCHC 32.9 g/dL      RDW 13.3 %      RDW-SD 44.3 fl      MPV 9.4 fL      Platelets 194 10*3/mm3      Neutrophil % 89.8 %      Lymphocyte % 4.5 %      Monocyte % 5.0 %      Eosinophil % 0.1 %      Basophil % 0.4 %      Immature Grans % 0.2 %      Neutrophils, Absolute 7.53 10*3/mm3      Lymphocytes, Absolute 0.38  10*3/mm3      Monocytes, Absolute 0.42 10*3/mm3      Eosinophils, Absolute 0.01 10*3/mm3      Basophils, Absolute 0.03 10*3/mm3      Immature Grans, Absolute 0.02 10*3/mm3      nRBC 0.0 /100 WBC     Comprehensive Metabolic Panel [[id number]]  (Abnormal) Collected: 11/09/23 0822    Specimen: Blood Updated: 11/09/23 0852     Glucose 166 mg/dL      BUN 18 mg/dL      Creatinine 1.00 mg/dL      Sodium 139 mmol/L      Potassium 3.8 mmol/L      Chloride 99 mmol/L      CO2 30.6 mmol/L      Calcium 9.7 mg/dL      Total Protein 6.1 g/dL      Albumin 4.1 g/dL      ALT (SGPT) 14 U/L      AST (SGOT) 16 U/L      Alkaline Phosphatase 49 U/L      Total Bilirubin 0.4 mg/dL      Globulin 2.0 gm/dL      A/G Ratio 2.1 g/dL      BUN/Creatinine Ratio 18.0     Anion Gap 9.4 mmol/L      eGFR 74.7 mL/min/1.73     Narrative:      GFR Normal >60  Chronic Kidney Disease <60  Kidney Failure <15    The GFR formula is only valid for adults with stable renal function between ages 18 and 70.    Single High Sensitivity Troponin T [[id number]]  (Normal) Collected: 11/09/23 0822    Specimen: Blood Updated: 11/09/23 0852     HS Troponin T 17 ng/L     Narrative:      High Sensitive Troponin T Reference Range:  <14.0 ng/L- Negative Female for AMI  <22.0 ng/L- Negative Male for AMI  >=14 - Abnormal Female indicating possible myocardial injury.  >=22 - Abnormal Male indicating possible myocardial injury.   Clinicians would have to utilize clinical acumen, EKG, Troponin, and serial changes to determine if it is an Acute Myocardial Infarction or myocardial injury due to an underlying chronic condition.         Urinalysis With Microscopic If Indicated (No Culture) - Urine, Clean Catch [[id number]]  (Abnormal) Collected: 11/09/23 1017    Specimen: Urine, Clean Catch Updated: 11/09/23 1028     Color, UA Yellow     Appearance, UA Turbid     pH, UA >=9.0     Specific Gravity, UA >=1.030     Glucose, UA Negative     Ketones, UA Negative     Bilirubin, UA Negative      Blood, UA Negative     Protein, UA Negative     Leuk Esterase, UA Negative     Nitrite, UA Negative     Urobilinogen, UA 1.0 E.U./dL    Narrative:      Urine microscopic not indicated.             Imaging:    US Gallbladder    Result Date: 11/9/2023  PROCEDURE: US GALLBLADDER  COMPARISON:[facility], CT, CT ABDOMEN PELVIS W CONTRAST, 11/09/2023, 9:03.  INDICATIONS: epigastric pain  TECHNIQUE: A limited ultrasound examination of the right upper quadrant was performed.   FINDINGS:  The pancreas is not well seen secondary to overlying bowel gas.  The liver measures 15.8 cm and appears normal with normal vasculature.  The gallbladder is distended with several bulky stones with gallbladder wall thickening up to 4.1 mm and suggestion of pericholecystic fluid.  There was a negative sonographic Valadez's sign.  The common bile duct is normal in caliber at 5 mm.  The right kidney measures 10.1 cm and appears normal.       Distended gallbladder with bulky stones with gallbladder wall thickening and suggestion of pericholecystic fluid.  There was a negative sonographic Valadez's sign.  In the correct clinical setting, this could represent cholecystitis.     LOYD TIPTON MD       Electronically Signed and Approved By: LOYD TIPTON MD on 11/09/2023 at 11:07             CT Abdomen Pelvis With Contrast    Result Date: 11/9/2023  PROCEDURE: CT ABDOMEN PELVIS W CONTRAST  COMPARISON: [facility], CT, ABD PEL W/O CONTRAST, 2/29/2020, 13:12.  INDICATIONS: Epigastric pain  TECHNIQUE: After obtaining the patient's consent, CT images were created with non-ionic intravenous contrast material.   PROTOCOL:   Standard imaging protocol performed    RADIATION:   DLP: 355.5mGy*cm   Automated exposure control was utilized to minimize radiation dose. CONTRAST: 100cc Isovue 370 I.V.  FINDINGS:  Heart size within normal limits.  Small low-density pericardial effusion similar to 2020 comparison code pain.  Linear  atelectasis/scar within lower lobes.  5 mm left lower lobe nodule not clearly seen on 2020 comparison.  Normal size and contour of the liver.  Large gallstones with fluid distended gallbladder.  No significant wall thickening or pericholecystic fluid.  Mild prominence of the biliary tree with common bile duct measuring up to 9 mm likely within normal limits for patient age.  Smooth tapering to the level of the ampulla.  Senescent related pancreatic atrophy without active inflammation.  Spleen is unremarkable.  No concerning adrenal nodule.  Asymmetric right renal atrophy.  Punctate nonobstructing left midpole calculus.  Low-density likely benign renal cysts are noted.  No hydronephrosis.  Urinary bladder unremarkable.  Small sliding hiatal hernia.  Expected configuration of stomach and duodenum.  Colonic diverticulosis without signs of active inflammation or obstruction.  Moderate to large colonic stool.  A loop of bowel is contained within a broad umbilical hernia as well as a small periumbilical hernia without associated edema or obstruction.  Aortic atherosclerotic disease without aneurysm.  No suspicious adenopathy.  No ascites, free air or drainable collection.  Degenerative changes in the spine without acute displaced fracture or aggressive lesion.          1. Cholelithiasis with fluid distended gallbladder.  This could represent acute cholecystitis in the right clinical setting. 2. Probable age-related prominence of the biliary tree with smooth tapering to the ampulla. 3. Small sliding hiatal hernia. 4. Moderate to large colonic stool without active inflammation or obstruction.  Correlate for constipation. 5. Colonic diverticulosis. 6. Bowel containing umbilical and periumbilical hernias without obstruction. 7. Small 5 mm left lower lobe nodule.  If high risk for primary lung malignancy consider 1 year follow-up chest CT.     EMILY FAITH MD       Electronically Signed and Approved By: EMILY FAITH MD  on 11/09/2023 at 9:19             XR Chest 1 View    Result Date: 11/9/2023  PROCEDURE: XR CHEST 1 VW  COMPARISON: [facility], CR, CHEST PA/AP & LAT 2V, 4/22/2021, 13:52.  INDICATIONS: epigastric pain, heart burn  FINDINGS:  Cardiac size is normal.  The pulmonary vascular markings are normal.  There is minimal discoid atelectasis in the right lung base.  The lungs are otherwise clear        1. Minimal right basilar discoid atelectasis.       Kristopher Bishop MD       Electronically Signed and Approved By: Kristopher Bishop MD on 11/09/2023 at 7:48                  ED course:    1110 upon reevaluation. Patient states his pain is completely gone. We discussed at length his gallbladder ultrasound which shows gallstones and potential inflammation however his labs are unremarkable.  Patient states he is not having pain at this time.  We discussed together and agreed he can follow-up closely with general surgery.  Patient would like discharge at this time.      We discussed gallstones, left lower lung nodule, hiatal hernia which may be contributing to his symptoms, etc.  Patient verbalized understanding      I have discussed with the patient the emergency department work-up including all test results, the differential diagnosis, the diagnosis given in the emergency department, and the absolute need for follow-up with the primary care physician.  I have discussed all prescriptions and treatments.  I have discussed the possible worsening of their condition, and also discussed criteria for return to the emergency department which includes but is not limited to worsening condition or lack of improvement of the current condition.  I have informed them that a normal work-up evaluation in the emergency department and/or discharge from the emergency department, does not signify that no disease process is present, but simply that there is no emergent indication for admission.  All questions were answered at this time.  I  informed them that significant pathology may still be present, but they may need further work-up, and that this further investigation should be undertaken by the primary care physician. He voiced his/her understanding.  Patient is agreeable to plan for discharge.    Discharge instructions include:    Follow-up with your PCP, Dr. Mckeon for reevaluation and recheck of blood pressure.      Blood sugar was elevated today and you may need a hemoglobin A1c for further evaluation    Follow-up with Dr. Gutierrez, general surgery for further evaluation of your gallstones.    Take Pepcid as directed.    Avoid greasy fatty and dairy foods until follow-up with general surgery    Return the emergency department for fever, vomiting, return of abdominal pain, chest pain, shortness of breath, blood in urine or bowel movements, abdominal swelling, new change or worsening symptoms.        Medical Decision Making:  Patient presents with epigastric abdominal pain after eating potatoes and chicken.  Patient states the food was greasy.  Patient's work-up is reassuring with a negative white count.               Differential Diagnosis and Discussion:  ACS, reflux, gastritis, esophagitis          Social Determinants of Health:    Patient is independent, reliable, and has access to care.       MDM     Amount and/or Complexity of Data Reviewed  Clinical lab tests: reviewed  Tests in the radiology section of CPT®: reviewed  Tests in the medicine section of CPT®: reviewed    Risk of Complications, Morbidity, and/or Mortality  Presenting problems: moderate  Diagnostic procedures: moderate  Management options: low    Patient Progress  Patient progress: improved               Final diagnoses:   Gallstones   Epigastric pain   Elevated blood sugar   Abnormal US (ultrasound) of abdomen (gallbladder)   Nodule of lower lobe of left lung   Hiatal hernia           Inge Delacruz, APRN  11/09/23 1124

## 2023-11-09 NOTE — DISCHARGE INSTRUCTIONS
Follow-up with your PCP, Dr. Mckeon for reevaluation and recheck of blood pressure.    Blood sugar was elevated today and you may need a hemoglobin A1c for further evaluation    Follow-up with Dr. Gutierrez, general surgery for further evaluation of your gallstones.    Take Pepcid as directed.    Avoid greasy fatty and dairy foods until follow-up with general surgery    Return the emergency department for fever, vomiting, return of abdominal pain, chest pain, shortness of breath, blood in urine or bowel movements, abdominal swelling, new change or worsening symptoms.

## 2023-11-11 LAB
QT INTERVAL: 378 MS
QTC INTERVAL: 409 MS

## 2023-11-29 ENCOUNTER — PREP FOR SURGERY (OUTPATIENT)
Dept: OTHER | Facility: HOSPITAL | Age: 83
End: 2023-11-29
Payer: COMMERCIAL

## 2023-11-29 ENCOUNTER — OFFICE VISIT (OUTPATIENT)
Dept: SURGERY | Facility: CLINIC | Age: 83
End: 2023-11-29
Payer: COMMERCIAL

## 2023-11-29 VITALS — BODY MASS INDEX: 21.53 KG/M2 | HEIGHT: 66 IN | RESPIRATION RATE: 16 BRPM | WEIGHT: 134 LBS

## 2023-11-29 DIAGNOSIS — K80.20 CALCULUS OF GALLBLADDER WITHOUT CHOLECYSTITIS WITHOUT OBSTRUCTION: Primary | ICD-10-CM

## 2023-11-29 PROCEDURE — 99203 OFFICE O/P NEW LOW 30 MIN: CPT | Performed by: SURGERY

## 2023-11-29 RX ORDER — SODIUM CHLORIDE 0.9 % (FLUSH) 0.9 %
10 SYRINGE (ML) INJECTION EVERY 12 HOURS SCHEDULED
OUTPATIENT
Start: 2023-11-29

## 2023-11-29 RX ORDER — SODIUM CHLORIDE 9 MG/ML
40 INJECTION, SOLUTION INTRAVENOUS AS NEEDED
OUTPATIENT
Start: 2023-11-29

## 2023-11-29 RX ORDER — INDOCYANINE GREEN AND WATER 25 MG
2.5 KIT INJECTION ONCE
OUTPATIENT
Start: 2023-11-29 | End: 2023-11-29

## 2023-11-29 RX ORDER — SODIUM CHLORIDE 0.9 % (FLUSH) 0.9 %
10 SYRINGE (ML) INJECTION AS NEEDED
OUTPATIENT
Start: 2023-11-29

## 2023-12-15 ENCOUNTER — TELEPHONE (OUTPATIENT)
Dept: UROLOGY | Facility: CLINIC | Age: 83
End: 2023-12-15
Payer: COMMERCIAL

## 2023-12-15 DIAGNOSIS — R39.15 URINARY URGENCY: ICD-10-CM

## 2023-12-15 RX ORDER — OXYBUTYNIN CHLORIDE 5 MG/1
5 TABLET, EXTENDED RELEASE ORAL DAILY
Qty: 90 TABLET | Refills: 1 | Status: SHIPPED | OUTPATIENT
Start: 2023-12-15

## 2023-12-20 ENCOUNTER — TELEPHONE (OUTPATIENT)
Dept: SURGERY | Facility: CLINIC | Age: 83
End: 2023-12-20
Payer: COMMERCIAL

## 2023-12-20 NOTE — TELEPHONE ENCOUNTER
PATIENT IS SCHEDULED FOR SURGERY 01/05/24.  HE SAID IT IS IN HIS INFORMATION TO CALL TO SCHEDULE A F/U WITH DR. EDMOND.    IS HE SUPPOSED TO GO AHEAD AND SCHEDULE A F/U OR WILL THEY SCHEDULE IT AT THE HOSPITAL AFTER SURGERY?

## 2023-12-20 NOTE — TELEPHONE ENCOUNTER
SPOKE TO THE PATIENT AND LET HIM KNOW THAT THE HOSPITAL WILL MAKE HIS FOLLOW UP APPOINTMENT WITH DR EDMOND AFTER HIS SURGERY.

## 2023-12-22 DIAGNOSIS — R39.15 URINARY URGENCY: ICD-10-CM

## 2023-12-22 RX ORDER — OXYBUTYNIN CHLORIDE 5 MG/1
5 TABLET, EXTENDED RELEASE ORAL DAILY
Qty: 90 TABLET | Refills: 1 | Status: SHIPPED | OUTPATIENT
Start: 2023-12-22

## 2023-12-22 NOTE — TELEPHONE ENCOUNTER
Patient called and stated that he needed dr shah to approve his oxybutynin script called pharmacy and they stated that patient lost his script and was needing more.

## 2023-12-28 NOTE — PRE-PROCEDURE INSTRUCTIONS
IMPORTANT INSTRUCTIONS - PRE-ADMISSION TESTING  DO NOT EAT OR CHEW anything after midnight the night before your procedure.    You may have CLEAR liquids up to 2 hours prior to ARRIVAL time.   Take the following medications the morning of your procedure with JUST A SIP OF WATER: OXYBUTYNIN, PEPCID IF NEEDED  DO NOT BRING your medications to the hospital with you, UNLESS something has changed since your PRE-Admission Testing appointment.  Hold all vitamins, supplements, and NSAIDS (Non- steroidal anti-inflammatory meds) for one week prior to surgery (you MAY take Tylenol or Acetaminophen).  If you are diabetic, check your blood sugar the morning of your procedure. If it is less than 70 or if you are feeling symptomatic, call the following number for further instructions: [phone] SAME DAY SURGERY_.  Use your inhalers/nebulizers as usual, the morning of your procedure. BRING YOUR INHALERS with you.   Bring your CPAP or BIPAP to hospital, ONLY IF YOU WILL BE SPENDING THE NIGHT.   Make sure you have a ride home and have someone who will stay with you the day of your procedure after you go home.  If you have any questions, please call your Pre-Admission Testing NurseBERTO_ at [phone]_.   Per anesthesia request, do not smoke for 24 hours before your procedure or as instructed by your surgeon.  Clear Liquid Diet        Find out when you need to start a clear liquid diet.   Think of “clear liquids” as anything you could read a newspaper through. This includes things like water, broth, sports drinks, or tea WITHOUT any kind of milk or cream.           Once you are told to start a clear liquid diet, only drink these things until 2 hours before arrival to the hospital or when the hospital says to stop. Total volume limitation: 8 oz.       Clear liquids you CAN drink:   Water   Clear broth: beef, chicken, vegetable, or bone broth with nothing in it   Gatorade   Lemonade or Yuriy-aid   Soda   Tea, coffee (NO cream or  honey)   Jell-O (without fruit)   Popsicles (without fruit or cream)   Italian ices   Juice without pulp: apple, white, grape   You may use salt, pepper, and sugar    Do NOT drink:   Milk or cream   Soy milk, almond milk, coconut milk, or other non-dairy drinks and   creamers   Milkshakes or smoothies   Tomato juice   Orange juice   Grapefruit juice   Cream soups or any other than broth         Clear Liquid Diet:  Do NOT eat any solid food.  Do NOT eat or suck on mints or candy.  Do NOT chew gum.  Do NOT drink thick liquids like milk or juice with pulp in it.  Do NOT add milk, cream, or anything like soy milk or almond milk to coffee or tea.   PREOPERATIVE (BEFORE SURGERY)              BATHING INSTRUCTIONS  Instructions:    You will need to shower 1 TIME   Wash your hair and face with normal shampoo and soap, rinse it well before using the surgical soap.      In the shower, wet the skin completely with water from your neck to your feet. Apply the cleanser to your   body ONLY FROM THE NECK TO YOUR FEET.     Do NOT USE THE CLEANSER ON YOUR FACE, HEAD, OR GENITAL (PRIVATE) AREAS.   Keep it out of your eyes, ears, and mouth because of the risk of injury to those areas.      Scrub with a clean washcloth for each bath utilizing the soap provided from the top of your body to the   bottom starting at the neck area.      Pay close attention to your armpits, groin area, and the site of surgery.      Wash your body gently for 5 minutes. Stand outside the stream or turn off the water while scrubbing your   body. Do NOT wash with your regular soap after the surgical cleanser is used.      RINSE THE CLEANSER OFF COMPLETELY with plenty of water. Rinse the area again thoroughly.      Dry off with a clean towel. The surgical soap can cause dryness; however do NOT APPLY LOTION,   CREAM, POWDER, and/or DEODORANT AFTER SHOWERING.     Be sure to where clean clothes after showering.      Ensure CLEAN BED LINENS AFTER FIRST wash with the  surgical soap.      NO PETS ALLOWED IN THE BED with you after utilizing the surgical soap.

## 2023-12-28 NOTE — DISCHARGE INSTRUCTIONS
IMPORTANT INSTRUCTIONS - PRE-ADMISSION TESTING  DO NOT EAT OR CHEW anything after midnight the night before your procedure.    You may have CLEAR liquids up to 2 hours prior to ARRIVAL time.   Take the following medications the morning of your procedure with JUST A SIP OF WATER:  ______________________________________________________________________________________________________________________________________________________________________________    DO NOT BRING your medications to the hospital with you, UNLESS something has changed since your PRE-Admission Testing appointment.  Hold all vitamins, supplements, and NSAIDS (Non- steroidal anti-inflammatory meds) for one week prior to surgery (you MAY take Tylenol or Acetaminophen).  If you are diabetic, check your blood sugar the morning of your procedure. If it is less than 70 or if you are feeling symptomatic, call the following number for further instructions: [phone]  Use your inhalers/nebulizers as usual, the morning of your procedure. BRING YOUR INHALERS with you.   Bring your CPAP or BIPAP to hospital, ONLY IF YOU WILL BE SPENDING THE NIGHT.   Make sure you have a ride home and have someone who will stay with you the day of your procedure after you go home.  If you have any questions, please call your Pre-Admission Testing Nurse, ________________ at [phone]_.   Per anesthesia request, do not smoke for 24 hours before your procedure or as instructed by your surgeon.  Clear Liquid Diet        Find out when you need to start a clear liquid diet.   Think of “clear liquids” as anything you could read a newspaper through. This includes things like water, broth, sports drinks, or tea WITHOUT any kind of milk or cream.           Once you are told to start a clear liquid diet, only drink these things until 2 hours before arrival to the hospital or when the hospital says to stop. Total volume limitation: 8 oz.       Clear liquids you CAN drink:    Water   Clear broth: beef, chicken, vegetable, or bone broth with nothing in it   Gatorade   Lemonade or Yuriy-aid   Soda   Tea, coffee (NO cream or honey)   Jell-O (without fruit)   Popsicles (without fruit or cream)   Italian ices   Juice without pulp: apple, white, grape   You may use salt, pepper, and sugar    Do NOT drink:   Milk or cream   Soy milk, almond milk, coconut milk, or other non-dairy drinks and   creamers   Milkshakes or smoothies   Tomato juice   Orange juice   Grapefruit juice   Cream soups or any other than broth         Clear Liquid Diet:  Do NOT eat any solid food.  Do NOT eat or suck on mints or candy.  Do NOT chew gum.  Do NOT drink thick liquids like milk or juice with pulp in it.  Do NOT add milk, cream, or anything like soy milk or almond milk to coffee or tea.   PREOPERATIVE (BEFORE SURGERY)              BATHING INSTRUCTIONS  Instructions:    You will need to shower 1 2 3 separate times utilizing the soap provided; at the times indicated   below:     - AM PM   - AM PM   - AM PM      Wash your hair and face with normal shampoo and soap, rinse it well before using the surgical soap.      In the shower, wet the skin completely with water from your neck to your feet. Apply the cleanser to your   body ONLY FROM THE NECK TO YOUR FEET.     Do NOT USE THE CLEANSER ON YOUR FACE, HEAD, OR GENITAL (PRIVATE) AREAS.   Keep it out of your eyes, ears, and mouth because of the risk of injury to those areas.      Scrub with a clean washcloth for each bath utilizing the soap provided from the top of your body to the   bottom starting at the neck area.      Pay close attention to your armpits, groin area, and the site of surgery.      Wash your body gently for 5 minutes. Stand outside the stream or turn off the water while scrubbing your   body. Do NOT wash with your regular soap after the surgical cleanser is used.      RINSE THE CLEANSER OFF COMPLETELY with plenty of water. Rinse the area again  thoroughly.      Dry off with a clean towel. The surgical soap can cause dryness; however do NOT APPLY LOTION,   CREAM, POWDER, and/or DEODORANT AFTER SHOWERING.     Be sure to where clean clothes after showering.      Ensure CLEAN BED LINENS AFTER FIRST wash with the surgical soap.      NO PETS ALLOWED IN THE BED with you after utilizing the surgical soap.

## 2024-01-05 ENCOUNTER — ANESTHESIA (OUTPATIENT)
Dept: PERIOP | Facility: HOSPITAL | Age: 84
End: 2024-01-05
Payer: COMMERCIAL

## 2024-01-05 ENCOUNTER — ANESTHESIA EVENT (OUTPATIENT)
Dept: PERIOP | Facility: HOSPITAL | Age: 84
End: 2024-01-05
Payer: COMMERCIAL

## 2024-01-05 ENCOUNTER — HOSPITAL ENCOUNTER (OUTPATIENT)
Facility: HOSPITAL | Age: 84
Discharge: HOME | End: 2024-01-07
Attending: SURGERY | Admitting: SURGERY
Payer: COMMERCIAL

## 2024-01-05 DIAGNOSIS — Z74.09 IMPAIRED MOBILITY AND ADLS: Primary | ICD-10-CM

## 2024-01-05 DIAGNOSIS — K80.20 CALCULUS OF GALLBLADDER WITHOUT CHOLECYSTITIS WITHOUT OBSTRUCTION: ICD-10-CM

## 2024-01-05 DIAGNOSIS — Z90.49 STATUS POST CHOLECYSTECTOMY: ICD-10-CM

## 2024-01-05 DIAGNOSIS — R26.2 DIFFICULTY WALKING: ICD-10-CM

## 2024-01-05 DIAGNOSIS — Z78.9 IMPAIRED MOBILITY AND ADLS: Primary | ICD-10-CM

## 2024-01-05 PROBLEM — Z48.89 AFTERCARE FOLLOWING SURGERY: Status: ACTIVE | Noted: 2024-01-05

## 2024-01-05 PROCEDURE — 25810000003 LACTATED RINGERS PER 1000 ML: Performed by: ANESTHESIOLOGY

## 2024-01-05 PROCEDURE — 25010000002 FENTANYL CITRATE (PF) 50 MCG/ML SOLUTION

## 2024-01-05 PROCEDURE — 25010000002 ONDANSETRON PER 1 MG

## 2024-01-05 PROCEDURE — 0 HYDROMORPHONE 1 MG/ML SOLUTION

## 2024-01-05 PROCEDURE — 25010000002 BUPIVACAINE (PF) 0.25 % SOLUTION 10 ML VIAL: Performed by: SURGERY

## 2024-01-05 PROCEDURE — 25010000002 SUGAMMADEX 200 MG/2ML SOLUTION

## 2024-01-05 PROCEDURE — 25010000002 VANCOMYCIN 5 G RECONSTITUTED SOLUTION: Performed by: SURGERY

## 2024-01-05 PROCEDURE — 25010000002 MIDAZOLAM PER 1MG: Performed by: ANESTHESIOLOGY

## 2024-01-05 PROCEDURE — 25010000002 PROPOFOL 10 MG/ML EMULSION

## 2024-01-05 PROCEDURE — 25810000003 SODIUM CHLORIDE 0.9 % SOLUTION: Performed by: SURGERY

## 2024-01-05 PROCEDURE — 25010000002 INDOCYANINE GREEN 25 MG RECONSTITUTED SOLUTION: Performed by: SURGERY

## 2024-01-05 PROCEDURE — C1781 MESH (IMPLANTABLE): HCPCS | Performed by: SURGERY

## 2024-01-05 PROCEDURE — 25010000002 ONDANSETRON PER 1 MG: Performed by: SURGERY

## 2024-01-05 PROCEDURE — 25010000002 DEXAMETHASONE PER 1 MG

## 2024-01-05 PROCEDURE — 88304 TISSUE EXAM BY PATHOLOGIST: CPT | Performed by: SURGERY

## 2024-01-05 DEVICE — ABSORBABLE WOUND CLOSURE DEVICE
Type: IMPLANTABLE DEVICE | Site: ABDOMEN | Status: FUNCTIONAL
Brand: V-LOC 180

## 2024-01-05 DEVICE — ABSORBABLE HEMOSTAT (OXIDIZED REGENERATED CELLULOSE)
Type: IMPLANTABLE DEVICE | Site: ABDOMEN | Status: FUNCTIONAL
Brand: SURGICEL

## 2024-01-05 DEVICE — PHASIX ST MESH, RECTANGLE
Type: IMPLANTABLE DEVICE | Site: ABDOMEN | Status: FUNCTIONAL
Brand: PHASIX ST MESH

## 2024-01-05 RX ORDER — SODIUM CHLORIDE 9 MG/ML
40 INJECTION, SOLUTION INTRAVENOUS AS NEEDED
Status: DISCONTINUED | OUTPATIENT
Start: 2024-01-05 | End: 2024-01-05 | Stop reason: HOSPADM

## 2024-01-05 RX ORDER — ROCURONIUM BROMIDE 10 MG/ML
INJECTION, SOLUTION INTRAVENOUS AS NEEDED
Status: DISCONTINUED | OUTPATIENT
Start: 2024-01-05 | End: 2024-01-05 | Stop reason: SURG

## 2024-01-05 RX ORDER — SODIUM CHLORIDE 0.9 % (FLUSH) 0.9 %
10 SYRINGE (ML) INJECTION AS NEEDED
Status: DISCONTINUED | OUTPATIENT
Start: 2024-01-05 | End: 2024-01-05 | Stop reason: HOSPADM

## 2024-01-05 RX ORDER — SODIUM CHLORIDE, SODIUM LACTATE, POTASSIUM CHLORIDE, CALCIUM CHLORIDE 600; 310; 30; 20 MG/100ML; MG/100ML; MG/100ML; MG/100ML
9 INJECTION, SOLUTION INTRAVENOUS CONTINUOUS PRN
Status: DISCONTINUED | OUTPATIENT
Start: 2024-01-05 | End: 2024-01-05 | Stop reason: HOSPADM

## 2024-01-05 RX ORDER — MIDAZOLAM HYDROCHLORIDE 2 MG/2ML
1 INJECTION, SOLUTION INTRAMUSCULAR; INTRAVENOUS ONCE
Status: COMPLETED | OUTPATIENT
Start: 2024-01-05 | End: 2024-01-05

## 2024-01-05 RX ORDER — HYDROCODONE BITARTRATE AND ACETAMINOPHEN 10; 325 MG/1; MG/1
1 TABLET ORAL EVERY 4 HOURS PRN
Status: DISCONTINUED | OUTPATIENT
Start: 2024-01-05 | End: 2024-01-07 | Stop reason: HOSPADM

## 2024-01-05 RX ORDER — FENTANYL CITRATE 50 UG/ML
INJECTION, SOLUTION INTRAMUSCULAR; INTRAVENOUS AS NEEDED
Status: DISCONTINUED | OUTPATIENT
Start: 2024-01-05 | End: 2024-01-05 | Stop reason: SURG

## 2024-01-05 RX ORDER — MIDAZOLAM HYDROCHLORIDE 2 MG/2ML
2 INJECTION, SOLUTION INTRAMUSCULAR; INTRAVENOUS ONCE
Status: DISCONTINUED | OUTPATIENT
Start: 2024-01-05 | End: 2024-01-05

## 2024-01-05 RX ORDER — PHENYLEPHRINE HCL IN 0.9% NACL 1 MG/10 ML
SYRINGE (ML) INTRAVENOUS AS NEEDED
Status: DISCONTINUED | OUTPATIENT
Start: 2024-01-05 | End: 2024-01-05 | Stop reason: SURG

## 2024-01-05 RX ORDER — NALOXONE HCL 0.4 MG/ML
0.4 VIAL (ML) INJECTION
Status: DISCONTINUED | OUTPATIENT
Start: 2024-01-05 | End: 2024-01-07 | Stop reason: HOSPADM

## 2024-01-05 RX ORDER — OXYBUTYNIN CHLORIDE 5 MG/1
5 TABLET, EXTENDED RELEASE ORAL DAILY
Status: DISCONTINUED | OUTPATIENT
Start: 2024-01-06 | End: 2024-01-07 | Stop reason: HOSPADM

## 2024-01-05 RX ORDER — HEPARIN SODIUM 5000 [USP'U]/ML
5000 INJECTION, SOLUTION INTRAVENOUS; SUBCUTANEOUS EVERY 12 HOURS SCHEDULED
Status: DISCONTINUED | OUTPATIENT
Start: 2024-01-06 | End: 2024-01-07 | Stop reason: HOSPADM

## 2024-01-05 RX ORDER — ONDANSETRON 2 MG/ML
4 INJECTION INTRAMUSCULAR; INTRAVENOUS EVERY 6 HOURS PRN
Status: DISCONTINUED | OUTPATIENT
Start: 2024-01-05 | End: 2024-01-07 | Stop reason: HOSPADM

## 2024-01-05 RX ORDER — NALOXONE HCL 0.4 MG/ML
0.1 VIAL (ML) INJECTION
Status: DISCONTINUED | OUTPATIENT
Start: 2024-01-05 | End: 2024-01-07 | Stop reason: HOSPADM

## 2024-01-05 RX ORDER — ONDANSETRON 2 MG/ML
INJECTION INTRAMUSCULAR; INTRAVENOUS AS NEEDED
Status: DISCONTINUED | OUTPATIENT
Start: 2024-01-05 | End: 2024-01-05 | Stop reason: SURG

## 2024-01-05 RX ORDER — DEXAMETHASONE SODIUM PHOSPHATE 4 MG/ML
INJECTION, SOLUTION INTRA-ARTICULAR; INTRALESIONAL; INTRAMUSCULAR; INTRAVENOUS; SOFT TISSUE AS NEEDED
Status: DISCONTINUED | OUTPATIENT
Start: 2024-01-05 | End: 2024-01-05 | Stop reason: SURG

## 2024-01-05 RX ORDER — INDOCYANINE GREEN AND WATER 25 MG
2.5 KIT INJECTION ONCE
Status: COMPLETED | OUTPATIENT
Start: 2024-01-05 | End: 2024-01-05

## 2024-01-05 RX ORDER — LIDOCAINE HYDROCHLORIDE 20 MG/ML
INJECTION, SOLUTION EPIDURAL; INFILTRATION; INTRACAUDAL; PERINEURAL AS NEEDED
Status: DISCONTINUED | OUTPATIENT
Start: 2024-01-05 | End: 2024-01-05 | Stop reason: SURG

## 2024-01-05 RX ORDER — PROPOFOL 10 MG/ML
VIAL (ML) INTRAVENOUS AS NEEDED
Status: DISCONTINUED | OUTPATIENT
Start: 2024-01-05 | End: 2024-01-05 | Stop reason: SURG

## 2024-01-05 RX ORDER — PROMETHAZINE HYDROCHLORIDE 12.5 MG/1
25 TABLET ORAL ONCE AS NEEDED
Status: DISCONTINUED | OUTPATIENT
Start: 2024-01-05 | End: 2024-01-05 | Stop reason: HOSPADM

## 2024-01-05 RX ORDER — ONDANSETRON 2 MG/ML
4 INJECTION INTRAMUSCULAR; INTRAVENOUS ONCE AS NEEDED
Status: DISCONTINUED | OUTPATIENT
Start: 2024-01-05 | End: 2024-01-05

## 2024-01-05 RX ORDER — HYDROCODONE BITARTRATE AND ACETAMINOPHEN 5; 325 MG/1; MG/1
1 TABLET ORAL EVERY 4 HOURS PRN
Status: DISCONTINUED | OUTPATIENT
Start: 2024-01-05 | End: 2024-01-07 | Stop reason: HOSPADM

## 2024-01-05 RX ORDER — PROMETHAZINE HYDROCHLORIDE 25 MG/1
25 SUPPOSITORY RECTAL ONCE AS NEEDED
Status: DISCONTINUED | OUTPATIENT
Start: 2024-01-05 | End: 2024-01-05

## 2024-01-05 RX ORDER — TRAZODONE HYDROCHLORIDE 50 MG/1
50 TABLET ORAL NIGHTLY
Status: DISCONTINUED | OUTPATIENT
Start: 2024-01-05 | End: 2024-01-07 | Stop reason: HOSPADM

## 2024-01-05 RX ORDER — ONDANSETRON 4 MG/1
4 TABLET, FILM COATED ORAL EVERY 6 HOURS PRN
Status: DISCONTINUED | OUTPATIENT
Start: 2024-01-05 | End: 2024-01-07 | Stop reason: HOSPADM

## 2024-01-05 RX ORDER — PROMETHAZINE HYDROCHLORIDE 25 MG/1
25 SUPPOSITORY RECTAL ONCE AS NEEDED
Status: DISCONTINUED | OUTPATIENT
Start: 2024-01-05 | End: 2024-01-05 | Stop reason: HOSPADM

## 2024-01-05 RX ORDER — MORPHINE SULFATE 2 MG/ML
2 INJECTION, SOLUTION INTRAMUSCULAR; INTRAVENOUS
Status: DISCONTINUED | OUTPATIENT
Start: 2024-01-05 | End: 2024-01-07 | Stop reason: HOSPADM

## 2024-01-05 RX ORDER — TAMSULOSIN HYDROCHLORIDE 0.4 MG/1
0.4 CAPSULE ORAL NIGHTLY
Status: DISCONTINUED | OUTPATIENT
Start: 2024-01-05 | End: 2024-01-07 | Stop reason: HOSPADM

## 2024-01-05 RX ORDER — OXYCODONE HYDROCHLORIDE 5 MG/1
5 TABLET ORAL
Status: DISCONTINUED | OUTPATIENT
Start: 2024-01-05 | End: 2024-01-05

## 2024-01-05 RX ORDER — FINASTERIDE 5 MG/1
5 TABLET, FILM COATED ORAL NIGHTLY
Status: DISCONTINUED | OUTPATIENT
Start: 2024-01-05 | End: 2024-01-07 | Stop reason: HOSPADM

## 2024-01-05 RX ORDER — SODIUM CHLORIDE 0.9 % (FLUSH) 0.9 %
10 SYRINGE (ML) INJECTION EVERY 12 HOURS SCHEDULED
Status: DISCONTINUED | OUTPATIENT
Start: 2024-01-05 | End: 2024-01-05 | Stop reason: HOSPADM

## 2024-01-05 RX ORDER — MAGNESIUM HYDROXIDE 1200 MG/15ML
LIQUID ORAL AS NEEDED
Status: DISCONTINUED | OUTPATIENT
Start: 2024-01-05 | End: 2024-01-05 | Stop reason: HOSPADM

## 2024-01-05 RX ORDER — PROMETHAZINE HYDROCHLORIDE 12.5 MG/1
25 TABLET ORAL ONCE AS NEEDED
Status: DISCONTINUED | OUTPATIENT
Start: 2024-01-05 | End: 2024-01-05

## 2024-01-05 RX ORDER — SODIUM CHLORIDE 9 MG/ML
50 INJECTION, SOLUTION INTRAVENOUS CONTINUOUS
Status: DISCONTINUED | OUTPATIENT
Start: 2024-01-05 | End: 2024-01-07 | Stop reason: HOSPADM

## 2024-01-05 RX ORDER — ACETAMINOPHEN 500 MG
1000 TABLET ORAL ONCE
Status: COMPLETED | OUTPATIENT
Start: 2024-01-05 | End: 2024-01-05

## 2024-01-05 RX ORDER — OXYCODONE HYDROCHLORIDE 5 MG/1
5 TABLET ORAL
Status: DISCONTINUED | OUTPATIENT
Start: 2024-01-05 | End: 2024-01-05 | Stop reason: HOSPADM

## 2024-01-05 RX ADMIN — TAMSULOSIN HYDROCHLORIDE 0.4 MG: 0.4 CAPSULE ORAL at 20:44

## 2024-01-05 RX ADMIN — DEXAMETHASONE SODIUM PHOSPHATE 4 MG: 4 INJECTION, SOLUTION INTRAMUSCULAR; INTRAVENOUS at 12:44

## 2024-01-05 RX ADMIN — SUGAMMADEX 400 MG: 100 INJECTION, SOLUTION INTRAVENOUS at 15:21

## 2024-01-05 RX ADMIN — ROCURONIUM BROMIDE 50 MG: 10 INJECTION, SOLUTION INTRAVENOUS at 12:16

## 2024-01-05 RX ADMIN — Medication 200 MCG: at 15:21

## 2024-01-05 RX ADMIN — ONDANSETRON 4 MG: 2 INJECTION INTRAMUSCULAR; INTRAVENOUS at 12:44

## 2024-01-05 RX ADMIN — MIDAZOLAM HYDROCHLORIDE 1 MG: 1 INJECTION, SOLUTION INTRAMUSCULAR; INTRAVENOUS at 11:35

## 2024-01-05 RX ADMIN — ROCURONIUM BROMIDE 20 MG: 10 INJECTION, SOLUTION INTRAVENOUS at 12:53

## 2024-01-05 RX ADMIN — SODIUM CHLORIDE 50 ML/HR: 9 INJECTION, SOLUTION INTRAVENOUS at 17:12

## 2024-01-05 RX ADMIN — SODIUM CHLORIDE, POTASSIUM CHLORIDE, SODIUM LACTATE AND CALCIUM CHLORIDE 9 ML/HR: 600; 310; 30; 20 INJECTION, SOLUTION INTRAVENOUS at 09:46

## 2024-01-05 RX ADMIN — ROCURONIUM BROMIDE 10 MG: 10 INJECTION, SOLUTION INTRAVENOUS at 13:39

## 2024-01-05 RX ADMIN — TRAZODONE HYDROCHLORIDE 50 MG: 50 TABLET ORAL at 20:44

## 2024-01-05 RX ADMIN — Medication 200 MCG: at 15:24

## 2024-01-05 RX ADMIN — PROPOFOL 100 MG: 10 INJECTION, EMULSION INTRAVENOUS at 12:16

## 2024-01-05 RX ADMIN — Medication 100 MCG: at 15:09

## 2024-01-05 RX ADMIN — ONDANSETRON 4 MG: 2 INJECTION INTRAMUSCULAR; INTRAVENOUS at 17:12

## 2024-01-05 RX ADMIN — SODIUM CHLORIDE, POTASSIUM CHLORIDE, SODIUM LACTATE AND CALCIUM CHLORIDE: 600; 310; 30; 20 INJECTION, SOLUTION INTRAVENOUS at 15:07

## 2024-01-05 RX ADMIN — FENTANYL CITRATE 50 MCG: 50 INJECTION, SOLUTION INTRAMUSCULAR; INTRAVENOUS at 12:16

## 2024-01-05 RX ADMIN — Medication 200 MCG: at 15:15

## 2024-01-05 RX ADMIN — Medication 100 MCG: at 15:12

## 2024-01-05 RX ADMIN — Medication 100 MCG: at 12:35

## 2024-01-05 RX ADMIN — INDOCYANINE GREEN AND WATER 2.5 MG: KIT at 09:47

## 2024-01-05 RX ADMIN — FINASTERIDE 5 MG: 5 TABLET, FILM COATED ORAL at 20:44

## 2024-01-05 RX ADMIN — ACETAMINOPHEN 1000 MG: 500 TABLET ORAL at 09:46

## 2024-01-05 RX ADMIN — LIDOCAINE HYDROCHLORIDE 50 MG: 20 INJECTION, SOLUTION EPIDURAL; INFILTRATION; INTRACAUDAL; PERINEURAL at 12:16

## 2024-01-05 RX ADMIN — FENTANYL CITRATE 50 MCG: 50 INJECTION, SOLUTION INTRAMUSCULAR; INTRAVENOUS at 12:42

## 2024-01-05 RX ADMIN — HYDROMORPHONE HYDROCHLORIDE 0.5 MG: 1 INJECTION, SOLUTION INTRAMUSCULAR; INTRAVENOUS; SUBCUTANEOUS at 14:26

## 2024-01-05 RX ADMIN — Medication 1000 MG: at 11:00

## 2024-01-05 RX ADMIN — Medication 100 MCG: at 15:18

## 2024-01-05 RX ADMIN — ROCURONIUM BROMIDE 10 MG: 10 INJECTION, SOLUTION INTRAVENOUS at 14:22

## 2024-01-05 NOTE — H&P
History of Present Illness  Abimael Randall is a 83 y.o. male presents to [facility] GENERAL SURGERY. The patient is to be seen for gallstones.     Gallstones  The patient was seen in the emergency room on 11/09/2023 for gallstones. He states he fell yesterday, 11/28/2023. The patient reports he got up on his own and felt really bad. He states he called [facility] and they told him to cover up with a blanket often. The patient reports after a while, he went to bed and woke up this morning and he was good. He states he was in the emergency room for 5.5 hours. The patient reports they did not admit him. He states they made a battery of tests. The patient reports he had prostate surgery a couple of years ago. He states Dr. Fink took care of the hernia. The patient reports he is not on any blood thinners. He denies any other surgeries on his abdomen. The patient reports he never had any health problems until he was eating. He states he only takes 4 medications. The patient reports he was told no greasy food or dairy products until he saw me. He denies any nausea or vomiting. The patient reports he has an ache in his abdomen that is periodic. He states when he gets into bed and covers up, it constantly goes away. The patient reports he has not had a bowel movement for 4 days. He states this is not unusual for him. The patient reports he does not do any heavy lifting. He states he can not lean over very successfully.           Objective   Medical History        Past Medical History:   Diagnosis Date    BPH (benign prostatic hyperplasia) 2/28/2022    Prostate disorder      Sleep disorder              Surgical History         Past Surgical History:   Procedure Laterality Date    CATARACT EXTRACTION, BILATERAL   2021               Current Outpatient Medications:     famotidine (Pepcid) 20 MG tablet, Take 1 tablet by mouth 2 (Two) Times a Day., Disp: 14 tablet, Rfl: 0    finasteride (PROSCAR) 5 MG  tablet, Take 1 tablet by mouth Daily., Disp: 90 tablet, Rfl: 3    oxybutynin XL (DITROPAN-XL) 5 MG 24 hr tablet, TAKE 1 TABLET BY MOUTH DAILY, Disp: 90 tablet, Rfl: 1    tamsulosin (FLOMAX) 0.4 MG capsule 24 hr capsule, TAKE 1 CAPSULE BY MOUTH DAILY, Disp: 90 capsule, Rfl: 4    traZODone (DESYREL) 50 MG tablet, , Disp: , Rfl:     oxybutynin (DITROPAN) 5 MG tablet, Take 1 tablet by mouth Every 8 (Eight) Hours As Needed (Bladder spasms). (Patient not taking: Reported on 6/15/2023), Disp: 60 tablet, Rfl: 2          Allergies   Allergen Reactions    Amoxicillin Other (See Comments)    Penicillins Other (See Comments)               Family History   Problem Relation Age of Onset    Breast cancer Sister           60s         Social History   Social History            Socioeconomic History    Marital status: Single   Tobacco Use    Smoking status: Former    Smokeless tobacco: Never   Vaping Use    Vaping Use: Never used   Substance and Sexual Activity    Alcohol use: Not Currently       Comment: 05/26/2021            Physical Exam         No acute distress. Nonlabored breathing. Abdomen is soft.           Result Review   :                     Assessment   Assessment and Plan    There are no diagnoses linked to this encounter.  Assessment  Patient with very distended gallbladder, large gallstones. He has 2 umbilical hernias.     Plan  We will plan for robotic cholecystectomy in order to get in to do the cholecystectomy. It looks like I am going to have to repair those periumbilical hernias, so we will plan for that at the same time. Risks, benefits, alternatives of the procedure were discussed extensively. All questions were answered. Patient voiced understanding and agreed to proceed with the above plan.        Follow Up   No follow-ups on file.  Patient was given instructions and counseling regarding his condition or for health maintenance advice. Please see specific information pulled into the AVS if appropriate.          Transcribed from ambient dictation for Kristopher Gutierrez MD by Theodora Caballero.  11/29/23   12:00 EST     Patient or patient representative verbalized consent to the visit recording.  I have personally performed the services described in this document as transcribed by the above individual, and it is both accurate and complete.

## 2024-01-05 NOTE — ANESTHESIA PREPROCEDURE EVALUATION
Anesthesia Evaluation     Patient summary reviewed and Nursing notes reviewed   no history of anesthetic complications:   NPO Solid Status: > 8 hours  NPO Liquid Status: > 2 hours           Airway   Mallampati: I  TM distance: >3 FB  Neck ROM: full  No difficulty expected  Dental      Pulmonary - normal exam    breath sounds clear to auscultation  (-) not a smoker  Cardiovascular - negative cardio ROS and normal exam  Exercise tolerance: good (4-7 METS)    Rhythm: regular  Rate: normal        Neuro/Psych- negative ROS  GI/Hepatic/Renal/Endo    (+) GERD well controlled    Musculoskeletal (-) negative ROS    Abdominal    Substance History - negative use     OB/GYN negative ob/gyn ROS         Other - negative ROS       ROS/Med Hx Other: BPH                    Anesthesia Plan    ASA 2     general     (Patient understands anesthesia not responsible for dental damage.)  intravenous induction     Anesthetic plan, risks, benefits, and alternatives have been provided, discussed and informed consent has been obtained with: patient.    Use of blood products discussed with patient .    Plan discussed with CRNA.        CODE STATUS:

## 2024-01-05 NOTE — ANESTHESIA POSTPROCEDURE EVALUATION
Patient: Abimael Randall    Procedure Summary       Date: 01/05/24 Room / Location: [facility] OR 08 / [facility] MAIN OR    Anesthesia Start: 1209 Anesthesia Stop: 1534    Procedures:       PARTIAL CHOLECYSTECTOMY LAPAROSCOPIC WITH DAVINCI ROBOT (Abdomen)      UMBILICAL HERNIA REPAIR LAPAROSCOPIC WITH DAVINCI ROBOT (Abdomen) Diagnosis:       Calculus of gallbladder without cholecystitis without obstruction      (Calculus of gallbladder without cholecystitis without obstruction [K80.20])    Surgeons: Kristopher Gutierrez MD Provider: Esau Samaniego CRNA    Anesthesia Type: general ASA Status: 2            Anesthesia Type: general    Vitals  Vitals Value Taken Time   /47 01/05/24 1635   Temp 36.3 °C (97.3 °F) 01/05/24 1635   Pulse 72 01/05/24 1640   Resp 13 01/05/24 1635   SpO2 95 % 01/05/24 1640   Vitals shown include unfiled device data.        Post Anesthesia Care and Evaluation    Patient location during evaluation: bedside  Patient participation: complete - patient participated  Level of consciousness: awake  Pain management: adequate    Airway patency: patent  PONV Status: none  Cardiovascular status: acceptable  Respiratory status: acceptable  Hydration status: acceptable    Comments: An Anesthesiologist personally participated in the most demanding procedures (including induction and emergence if applicable) in the anesthesia plan, monitored the course of anesthesia administration at frequent intervals and remained physically present and available for immediate diagnosis and treatment of emergencies.

## 2024-01-05 NOTE — OP NOTE
Preoperative diagnosis: Cholelithiasis with chronic cholecystitis    Postoperative diagnosis: Same    Procedure: Robotic partial cholecystectomy; incisional hernia repair    Surgeon: Brenda    Anesthesia: General    Assistant: Nadeen Ojeda    EBL: 23 mL    Specimens: Gallbladder with contents    Complications: None    Indications: 83-year-old male with gallstones and upper abdominal pain.  He also had an incisional hernia near the umbilicus.  Presents today for elective cholecystectomy and incisional hernia repair.    PROCEDURE    Patient was seen in the preoperative holding area.  Risks, benefits, and alternatives of the operation were again discussed in detail.  All of the patient's and family's questions were answered.  They voiced understanding, and agreed to proceed.    Patient was brought to the operating room.  Monitoring devices and Bernabe stockings were placed.  Anesthesia was administered.  Patient was prepped and draped in standard surgical fashion.  After prepping and draping a timeout was performed to verify both the correct patient and correct procedure.    We began by injecting local anesthesia in the left upper quadrant.  An incision to accommodate an 12 mm trocar was made in the skin, and the Veress needle was inserted into the abdomen.  Intra-abdominal placement was verified with a normal saline drop test.  After verification, the abdomen was insufflated to 15 mmHg pressure.  Once the abdomen was insufflated, we used the Visiport technique and the abdomen was entered.  Once the abdomen was entered we reinserted the laparoscope and looked underneath our Visiport and Veress needle entry sites, and we saw no obvious underlying injury.  We then placed our subsequent trocars.  After injection of local anesthesia and under direct visualization, a 12 mm trocar was placed in the left upper quadrant, and 3 8 mm trocars were placed in the right upper quadrant and upper mid abdomen.  The patient was placed  in reverse Trendelenburg position.    We began by taking down a lot of omental adhesions to the gallbladder.  The gallbladder appeared grossly and chronically inflamed.  It was very enlarged and distended.  We tried grasping the gallbladder and retracting it above the liver once we had most of the adhesions down..  We took down any further adhesions to the gallbladder with a combination of blunt dissection and electrocautery.  The gallbladder was then grasped and retracted out laterally.  We began by trying to make a window between the liver bed and the gallbladder itself.  The gallbladder was so grossly distended it crossed over the common duct.  I was able to see the common duct very clearly with IC-Green.  The gallbladder itself and the cystic duct did not light up at all with IC-Green.  We later found that this was the case because the gallbladder was completely filled with stone.    Seeing as I could not get a safe window between the liver bed and the gallbladder near the infundibulum I elected to try and work from a dome down fashion.  As I was trying to work from a dome down fashion we cavitary the liver secondary to the large size and weight of the gallbladder.  At this point I elected to go ahead and enter the gallbladder.  I opened up the dome of the gallbladder and encountered a very large stone.  We remove the stone and as we down into the gallbladder we could see that there were further large stones.  The gallbladder was completely filled with large stones.  I went ahead and fenestrated the gallbladder opening it up completely in order to get the stones out.  I dissected down to the gallbladder and found the takeoff of the cystic duct.  We noted that the mucosa of the gallbladder had some areas of necrosis.  Seeing as the gallbladder was so inflamed I elected to just go ahead and perform a partial cholecystectomy leaving the back wall of the gallbladder against the liver.  This way I could get the  stones out and get the gallbladder out and safely avoid injury to the common duct or any medial bile ducts and vessels.    Once I had the gallbladder fenestrated I amputated the gallbladder anterior wall and everything up to the level of the liver.  I then oversewed the cystic duct takeoff with a 3 oh V-Loc suture and ran that up the gallbladder to try and make sure that the cystic duct was completely closed and we would not have any reflux of bile.  We then fulgurated the gallbladder mucosa.  I placed a piece of Surgicel and the gallbladder bed.  We then brought in the Endo Catch bag.  I placed the wall of the gallbladder as well as the multiple large stones in the bag.  We irrigated and suctioned free any spilled bile or blood.    I then brought the Endo Catch bag out the left upper quadrant 12 mm robotic trocar site.  I did have to extend the site quite a bit in order to get out the extremely large stones.  We then closed the extraction site under direct visualization with multiple interrupted 0 Vicryl sutures using the suture passer.  We inspected our operative field and made sure everything appeared to be hemostatic.  We inspected the common bile duct with the IC-Green, and it appeared to be a single column of bile with no obvious signs of obstruction or injury.  We irrigated the right upper quadrant and suctioned free any bile or blood.  We irrigated until the effluent was clear, and suctioned that free.    We then turned our attention to the incisional hernia just above the umbilicus.  We undocked the robot and made new trocar incisions for the hernia repair.  We then went about placing our subsequent trocars.  After injection of local anesthesia and under direct visualization, an 8 mm trocar was placed in the left upper, left mid, and left lower abdomen.    We then brought in and docked the robot.  Once the robot was in appropriate position, we began by reducing any contents that were incarcerated in the  umbilical hernia.  We then dissected into the preperitoneal space, making sure we reduced all contents from the umbilical hernia.  Once everything was completely reduced from the umbilical hernia and we had a complete and adequate exposure, we closed the hernia defect primarily with a running 0 V-Loc suture.  Once the defect was closed we then brought in a piece of phasixST mesh.  It was 10 x 15 centimeters.  A chandelier stitch had been placed in the mesh, the chandelier stitch was grasped with a Silas-Lopez device through the center of the umbilical defect and pulled up flush with the anterior abdominal wall.  The mesh was then sewn into place circumferentially with multiple 3-0 V-Loc sutures.  We inspected the mesh to make sure it was sewn in 360 degrees with no gaps or folds.  Satisfied with the primary repair and coverage with the mesh, we then inspected the abdomen making sure everything appeared to be hemostatic and there were no obvious underlying injuries.  We then removed the robotic arms and undocked the robot.    We then removed the 12 mm trocar, and that site was closed with a Silas-Lopez device and 0 Vicryl suture under direct visualization.  The remaining trocars were then removed under direct visualization, and the abdomen was desufflated.  Skin incisions were closed with subcuticular 4-0 Vicryl stitch and dressed with skin glue.    The patient was then aroused from anesthesia, taken off the OR table, and taken to PACU in stable condition.  Sponge, needle, and instrument counts were correct x2.  Nadeen Ojeda was present for the entire procedure and helped in all portions of the case.    Assistant: Nadeen Ojeda CSA was responsible for performing the following activities: Retraction, Suction, Irrigation, Suturing, Closing, Placing Dressing, and Held/Positioned Camera and their skilled assistance was necessary for the success of this case.        The operative note was dictated with the help of  the dragon dictation system.

## 2024-01-06 PROBLEM — Z90.49 STATUS POST CHOLECYSTECTOMY: Status: ACTIVE | Noted: 2024-01-06

## 2024-01-06 LAB
ALBUMIN SERPL-MCNC: 3.6 G/DL (ref 3.5–5.2)
ALBUMIN/GLOB SERPL: 1.7 G/DL
ALP SERPL-CCNC: 73 U/L (ref 39–117)
ALT SERPL W P-5'-P-CCNC: 32 U/L (ref 1–41)
ANION GAP SERPL CALCULATED.3IONS-SCNC: 11.2 MMOL/L (ref 5–15)
AST SERPL-CCNC: 39 U/L (ref 1–40)
BASOPHILS # BLD AUTO: 0.02 10*3/MM3 (ref 0–0.2)
BASOPHILS NFR BLD AUTO: 0.2 % (ref 0–1.5)
BILIRUB SERPL-MCNC: 0.5 MG/DL (ref 0–1.2)
BUN SERPL-MCNC: 6 MG/DL (ref 8–23)
BUN/CREAT SERPL: 8.8 (ref 7–25)
CALCIUM SPEC-SCNC: 8.8 MG/DL (ref 8.6–10.5)
CHLORIDE SERPL-SCNC: 98 MMOL/L (ref 98–107)
CO2 SERPL-SCNC: 22.8 MMOL/L (ref 22–29)
CREAT SERPL-MCNC: 0.68 MG/DL (ref 0.76–1.27)
DEPRECATED RDW RBC AUTO: 44.3 FL (ref 37–54)
EGFRCR SERPLBLD CKD-EPI 2021: 92.2 ML/MIN/1.73
EOSINOPHIL # BLD AUTO: 0 10*3/MM3 (ref 0–0.4)
EOSINOPHIL NFR BLD AUTO: 0 % (ref 0.3–6.2)
ERYTHROCYTE [DISTWIDTH] IN BLOOD BY AUTOMATED COUNT: 13.9 % (ref 12.3–15.4)
GLOBULIN UR ELPH-MCNC: 2.1 GM/DL
GLUCOSE SERPL-MCNC: 147 MG/DL (ref 65–99)
HCT VFR BLD AUTO: 34.2 % (ref 37.5–51)
HGB BLD-MCNC: 11.3 G/DL (ref 13–17.7)
IMM GRANULOCYTES # BLD AUTO: 0.04 10*3/MM3 (ref 0–0.05)
IMM GRANULOCYTES NFR BLD AUTO: 0.5 % (ref 0–0.5)
LYMPHOCYTES # BLD AUTO: 0.34 10*3/MM3 (ref 0.7–3.1)
LYMPHOCYTES NFR BLD AUTO: 4 % (ref 19.6–45.3)
MCH RBC QN AUTO: 28.8 PG (ref 26.6–33)
MCHC RBC AUTO-ENTMCNC: 33 G/DL (ref 31.5–35.7)
MCV RBC AUTO: 87.2 FL (ref 79–97)
MONOCYTES # BLD AUTO: 0.57 10*3/MM3 (ref 0.1–0.9)
MONOCYTES NFR BLD AUTO: 6.7 % (ref 5–12)
NEUTROPHILS NFR BLD AUTO: 7.48 10*3/MM3 (ref 1.7–7)
NEUTROPHILS NFR BLD AUTO: 88.6 % (ref 42.7–76)
NRBC BLD AUTO-RTO: 0 /100 WBC (ref 0–0.2)
PLATELET # BLD AUTO: 238 10*3/MM3 (ref 140–450)
PMV BLD AUTO: 9.4 FL (ref 6–12)
POTASSIUM SERPL-SCNC: 4 MMOL/L (ref 3.5–5.2)
PROT SERPL-MCNC: 5.7 G/DL (ref 6–8.5)
RBC # BLD AUTO: 3.92 10*6/MM3 (ref 4.14–5.8)
SODIUM SERPL-SCNC: 132 MMOL/L (ref 136–145)
WBC NRBC COR # BLD AUTO: 8.45 10*3/MM3 (ref 3.4–10.8)

## 2024-01-06 PROCEDURE — 80053 COMPREHEN METABOLIC PANEL: CPT | Performed by: SURGERY

## 2024-01-06 PROCEDURE — 25010000002 HEPARIN (PORCINE) PER 1000 UNITS: Performed by: SURGERY

## 2024-01-06 PROCEDURE — 85025 COMPLETE CBC W/AUTO DIFF WBC: CPT | Performed by: SURGERY

## 2024-01-06 RX ADMIN — HEPARIN SODIUM 5000 UNITS: 5000 INJECTION INTRAVENOUS; SUBCUTANEOUS at 09:29

## 2024-01-06 RX ADMIN — HEPARIN SODIUM 5000 UNITS: 5000 INJECTION INTRAVENOUS; SUBCUTANEOUS at 20:07

## 2024-01-06 RX ADMIN — TAMSULOSIN HYDROCHLORIDE 0.4 MG: 0.4 CAPSULE ORAL at 20:07

## 2024-01-06 RX ADMIN — OXYBUTYNIN CHLORIDE 5 MG: 5 TABLET, EXTENDED RELEASE ORAL at 09:29

## 2024-01-06 RX ADMIN — FINASTERIDE 5 MG: 5 TABLET, FILM COATED ORAL at 20:06

## 2024-01-06 RX ADMIN — TRAZODONE HYDROCHLORIDE 50 MG: 50 TABLET ORAL at 20:07

## 2024-01-06 NOTE — PROGRESS NOTES
[facility]     Progress Note    Patient Name: Abimael Randall  : 1940  MRN: [id number]  Primary Care Physician:  Kwame Mckeon MD  Date of admission: 2024    Subjective   Subjective     Chief Complaint: Abdominal pain    HPI:  Patient Reports feeling relatively well.  Reports some difficulty with getting out of bed and sitting up secondary to the pain.  No nausea or vomiting.      Objective   Objective     Vitals:   Temp:  [97.2 °F (36.2 °C)-98.6 °F (37 °C)] 98.6 °F (37 °C)  Heart Rate:  [62-81] 69  Resp:  [12-18] 18  BP: (107-158)/(38-72) 143/56  FiO2 (%):  [56 %] 56 %    Physical Exam  Constitutional:       Appearance: Normal appearance.   Cardiovascular:      Rate and Rhythm: Normal rate.   Pulmonary:      Effort: Pulmonary effort is normal.   Abdominal:      Palpations: Abdomen is soft.         Result Review    Result Review:  I have personally reviewed the results from the time of this admission to 2024 11:36 EST and agree with these findings:  [x]  Laboratory  []  Microbiology  []  Radiology  []  EKG/Telemetry   []  Cardiology/Vascular   []  Pathology  []  Old records  []  Other:  Most notable findings include: White count normal, bilirubin normal    Assessment & Plan   Assessment / Plan     Brief Patient Summary:  Abimael Randall is a 83 y.o. male who status post robotic partial cholecystectomy and ventral hernia repair    Active Hospital Problems:  Active Hospital Problems    Diagnosis     **Calculus of gallbladder without cholecystitis without obstruction     Status post cholecystectomy     S/P cholecystectomy     Aftercare following surgery      Plan:  Patient has some difficulty with getting out of bed and given his age and relative immobility and the fact that he lives at home I would feel more comfortable with him staying at least 1 more night  May ask PT and OT to eval for home safety  Regular diet  As needed pain control  Ambulate       DVT prophylaxis:  Medical and  mechanical DVT prophylaxis orders are present.    CODE STATUS:   Code Status (Patient has no pulse and is not breathing): CPR (Attempt to Resuscitate)  Medical Interventions (Patient has pulse or is breathing): Full Support    Disposition:  I expect patient to be discharged tomorrow.    Electronically signed by Kristopher Gutierrez MD, 01/06/24, 11:36 AM EST.

## 2024-01-07 VITALS
OXYGEN SATURATION: 98 % | HEIGHT: 66 IN | BODY MASS INDEX: 20.94 KG/M2 | TEMPERATURE: 97.7 F | WEIGHT: 130.29 LBS | HEART RATE: 81 BPM | DIASTOLIC BLOOD PRESSURE: 66 MMHG | RESPIRATION RATE: 20 BRPM | SYSTOLIC BLOOD PRESSURE: 151 MMHG

## 2024-01-07 LAB
ALBUMIN SERPL-MCNC: 3.6 G/DL (ref 3.5–5.2)
ALBUMIN/GLOB SERPL: 1.4 G/DL
ALP SERPL-CCNC: 80 U/L (ref 39–117)
ALT SERPL W P-5'-P-CCNC: 30 U/L (ref 1–41)
ANION GAP SERPL CALCULATED.3IONS-SCNC: 9.8 MMOL/L (ref 5–15)
AST SERPL-CCNC: 25 U/L (ref 1–40)
BASOPHILS # BLD AUTO: 0.01 10*3/MM3 (ref 0–0.2)
BASOPHILS NFR BLD AUTO: 0.1 % (ref 0–1.5)
BILIRUB SERPL-MCNC: 0.6 MG/DL (ref 0–1.2)
BUN SERPL-MCNC: 6 MG/DL (ref 8–23)
BUN/CREAT SERPL: 7.9 (ref 7–25)
CALCIUM SPEC-SCNC: 9.3 MG/DL (ref 8.6–10.5)
CHLORIDE SERPL-SCNC: 96 MMOL/L (ref 98–107)
CO2 SERPL-SCNC: 25.2 MMOL/L (ref 22–29)
CREAT SERPL-MCNC: 0.76 MG/DL (ref 0.76–1.27)
DEPRECATED RDW RBC AUTO: 45.4 FL (ref 37–54)
EGFRCR SERPLBLD CKD-EPI 2021: 89.2 ML/MIN/1.73
EOSINOPHIL # BLD AUTO: 0 10*3/MM3 (ref 0–0.4)
EOSINOPHIL NFR BLD AUTO: 0 % (ref 0.3–6.2)
ERYTHROCYTE [DISTWIDTH] IN BLOOD BY AUTOMATED COUNT: 14.1 % (ref 12.3–15.4)
GLOBULIN UR ELPH-MCNC: 2.6 GM/DL
GLUCOSE SERPL-MCNC: 166 MG/DL (ref 65–99)
HCT VFR BLD AUTO: 37.6 % (ref 37.5–51)
HGB BLD-MCNC: 12.5 G/DL (ref 13–17.7)
IMM GRANULOCYTES # BLD AUTO: 0.05 10*3/MM3 (ref 0–0.05)
IMM GRANULOCYTES NFR BLD AUTO: 0.5 % (ref 0–0.5)
LYMPHOCYTES # BLD AUTO: 0.33 10*3/MM3 (ref 0.7–3.1)
LYMPHOCYTES NFR BLD AUTO: 3.1 % (ref 19.6–45.3)
MCH RBC QN AUTO: 29.2 PG (ref 26.6–33)
MCHC RBC AUTO-ENTMCNC: 33.2 G/DL (ref 31.5–35.7)
MCV RBC AUTO: 87.9 FL (ref 79–97)
MONOCYTES # BLD AUTO: 0.55 10*3/MM3 (ref 0.1–0.9)
MONOCYTES NFR BLD AUTO: 5.1 % (ref 5–12)
NEUTROPHILS NFR BLD AUTO: 9.84 10*3/MM3 (ref 1.7–7)
NEUTROPHILS NFR BLD AUTO: 91.2 % (ref 42.7–76)
NRBC BLD AUTO-RTO: 0 /100 WBC (ref 0–0.2)
PLATELET # BLD AUTO: 243 10*3/MM3 (ref 140–450)
PMV BLD AUTO: 8.9 FL (ref 6–12)
POTASSIUM SERPL-SCNC: 4 MMOL/L (ref 3.5–5.2)
PROT SERPL-MCNC: 6.2 G/DL (ref 6–8.5)
RBC # BLD AUTO: 4.28 10*6/MM3 (ref 4.14–5.8)
SODIUM SERPL-SCNC: 131 MMOL/L (ref 136–145)
WBC NRBC COR # BLD AUTO: 10.78 10*3/MM3 (ref 3.4–10.8)

## 2024-01-07 PROCEDURE — 97161 PT EVAL LOW COMPLEX 20 MIN: CPT

## 2024-01-07 PROCEDURE — 97165 OT EVAL LOW COMPLEX 30 MIN: CPT

## 2024-01-07 PROCEDURE — 85025 COMPLETE CBC W/AUTO DIFF WBC: CPT | Performed by: SURGERY

## 2024-01-07 PROCEDURE — 25810000003 SODIUM CHLORIDE 0.9 % SOLUTION: Performed by: SURGERY

## 2024-01-07 PROCEDURE — 80053 COMPREHEN METABOLIC PANEL: CPT | Performed by: SURGERY

## 2024-01-07 PROCEDURE — 25010000002 HEPARIN (PORCINE) PER 1000 UNITS: Performed by: SURGERY

## 2024-01-07 RX ORDER — DOCUSATE SODIUM 100 MG/1
100 CAPSULE, LIQUID FILLED ORAL 2 TIMES DAILY PRN
Qty: 10 CAPSULE | Refills: 1 | Status: SHIPPED | OUTPATIENT
Start: 2024-01-07 | End: 2025-01-06

## 2024-01-07 RX ORDER — HYDROCODONE BITARTRATE AND ACETAMINOPHEN 5; 325 MG/1; MG/1
1 TABLET ORAL EVERY 4 HOURS PRN
Qty: 20 TABLET | Refills: 0 | Status: SHIPPED | OUTPATIENT
Start: 2024-01-07

## 2024-01-07 RX ADMIN — SODIUM CHLORIDE 50 ML/HR: 9 INJECTION, SOLUTION INTRAVENOUS at 06:30

## 2024-01-07 RX ADMIN — OXYBUTYNIN CHLORIDE 5 MG: 5 TABLET, EXTENDED RELEASE ORAL at 09:19

## 2024-01-07 RX ADMIN — HEPARIN SODIUM 5000 UNITS: 5000 INJECTION INTRAVENOUS; SUBCUTANEOUS at 09:21

## 2024-01-07 NOTE — PLAN OF CARE
Goal Outcome Evaluation:  Plan of Care Reviewed With: patient        Progress: improving  Outcome Evaluation: VSS. No complaint of pain. Ambulated messina with standby assist and a walker. Alert and oriented x4.

## 2024-01-07 NOTE — THERAPY EVALUATION
Patient Name: Abimael Randall  : 1940    MRN: [id number]                              Today's Date: 2024       Admit Date: 2024    Visit Dx:     ICD-10-CM ICD-9-CM   1. Impaired mobility and ADLs  Z74.09 V49.89    Z78.9    2. Calculus of gallbladder without cholecystitis without obstruction  K80.20 574.20     Patient Active Problem List   Diagnosis    Sleep disorder    BPH (benign prostatic hyperplasia)    Artificial lens present    Posterior vitreous detachment of both eyes    Calculus of gallbladder without cholecystitis without obstruction    S/P cholecystectomy    Aftercare following surgery    Status post cholecystectomy     Past Medical History:   Diagnosis Date    BPH (benign prostatic hyperplasia) 2022    Cholecystitis     H/O prostatectomy     LAPAROSCOPIC  SUPRAPUBIC    Prostate disorder     Sleep disorder     Umbilical hernia      Past Surgical History:   Procedure Laterality Date    CATARACT EXTRACTION, BILATERAL      CHOLECYSTECTOMY N/A 2024    Procedure: PARTIAL CHOLECYSTECTOMY LAPAROSCOPIC WITH DAVINCI ROBOT;  Surgeon: Kristopher Gutierrez MD;  Location: [facility];  Service: Robotics - Enforainci;  Laterality: N/A;    SUPRAPUBIC PROSTATECTOMY      TONSILLECTOMY      VENTRAL HERNIA REPAIR N/A 2024    Procedure: UMBILICAL HERNIA REPAIR LAPAROSCOPIC WITH DAVINCI ROBOT;  Surgeon: Kristopher Gutierrez MD;  Location: [facility];  Service: Robotics - Enforainci;  Laterality: N/A;      General Information       Row Name 24 0824          OT Time and Intention    Document Type evaluation  -AC     Mode of Treatment individual therapy;occupational therapy  -       Row Name 24          General Information    Patient Profile Reviewed yes  -AC     Prior Level of Function --  Patient reports he used a walker for functional mobility and was independent with ADLs, he states he has a shower chair for bathing and a recliner to sleep and if needed.  -AC     Existing  Precautions/Restrictions fall  -     Barriers to Rehab none identified  -       Row Name 01/07/24 0824          Occupational Profile    Reason for Services/Referral (Occupational Profile) Pt. is a 83year old male admitted for the above diagnosis status post robotic cholecystectomy with hernia repair on 1/5/2024. Pt. referred to OT services to assess independence with ADLs and adl transfers/fx'l mobility. No previous OT services for current condition.  -       Row Name 01/07/24 0824          Living Environment    People in Home alone;other (see comments)  patient states his neighbor can assist if needed.  -       Row Name 01/07/24 0824          Home Main Entrance    Number of Stairs, Main Entrance three  -AC     Stair Railings, Main Entrance railings safe and in good condition  -       Row Name 01/07/24 0824          Cognition    Orientation Status (Cognition) oriented x 3  -       Row Name 01/07/24 0824          Safety Issues, Functional Mobility    Impairments Affecting Function (Mobility) balance;endurance/activity tolerance;pain  -               User Key  (r) = Recorded By, (t) = Taken By, (c) = Cosigned By      Initials Name Provider Type    AC Anais Stanley OT Occupational Therapist                     Mobility/ADL's       Row Name 01/07/24 0831          Bed Mobility    Bed Mobility, Safety Issues decreased use of arms for pushing/pulling  -     Comment, (Bed Mobility) attempted to teach patient log roll technique but required min A for supine to sit as patient pulled on OT. Patient educated on sleeping with head elevated to prevent discomfort with getting out of bed and patient stated he could sleep in his reclined.  -       Row Name 01/07/24 0831          Transfers    Transfers sit-stand transfer  -       Row Name 01/07/24 0831          Sit-Stand Transfer    Sit-Stand [place] (Transfers) standby assist;1 person assist  -       Row Name 01/07/24 0831          Functional Mobility     Functional Mobility- Comment Patient was SBA with rolling walker for stepping in place at EOB for approx 6 steps.  -       Row Name 01/07/24 0831          Activities of Daily Living    BADL Assessment/Intervention --  patient is setup for upper body bathing/dressing, setup for grooming, setup for self-feeding, SBA for lower body bathing/dressing, SBA for toileting.  -               User Key  (r) = Recorded By, (t) = Taken By, (c) = Cosigned By      Initials Name Provider Type    Anais Wilkins OT Occupational Therapist                   Obj/Interventions       Row Name 01/07/24 0835          Sensory Assessment (Somatosensory)    Sensory Assessment (Somatosensory) UE sensation intact  -       Row Name 01/07/24 0835          Vision Assessment/Intervention    Visual Impairment/Limitations WFL  -       Row Name 01/07/24 0835          Range of Motion Comprehensive    General Range of Motion bilateral upper extremity ROM WNL  -       Row Name 01/07/24 0835          Strength Comprehensive (MMT)    General Manual Muscle Testing (MMT) Assessment no strength deficits identified  -       Row Name 01/07/24 0835          Motor Skills    Motor Skills coordination;functional endurance  -AC     Coordination [facility]  -       Row Name 01/07/24 0835          Balance    Balance Assessment standing dynamic balance  -     Dynamic Standing Balance standby assist  -AC     Position/Device Used, Standing Balance supported;walker, front-wheeled  -               User Key  (r) = Recorded By, (t) = Taken By, (c) = Cosigned By      Initials Name Provider Type    Anais Wilkins OT Occupational Therapist                   Goals/Plan       Row Name 01/07/24 0837          Bed Mobility Goal 1 (OT)    Activity/Assistive Device (Bed Mobility Goal 1, OT) bed mobility activities, all  -AC     [place] Level/Cues Needed (Bed Mobility Goal 1, OT) modified independence  -AC     Time Frame (Bed Mobility Goal 1, OT) long term goal  (LTG);10 days  -AC       Row Name 01/07/24 0837          Transfer Goal 1 (OT)    Activity/Assistive Device (Transfer Goal 1, OT) transfers, all  -AC     [place] Level/Cues Needed (Transfer Goal 1, OT) modified independence  -AC     Time Frame (Transfer Goal 1, OT) long term goal (LTG);10 days  -AC       Row Name 01/07/24 0837          Bathing Goal 1 (OT)    Activity/Device (Bathing Goal 1, OT) bathing skills, all  -AC     [place] Level/Cues Needed (Bathing Goal 1, OT) modified independence  -AC     Time Frame (Bathing Goal 1, OT) long term goal (LTG);10 days  -AC       Row Name 01/07/24 0837          Dressing Goal 1 (OT)    Activity/Device (Dressing Goal 1, OT) dressing skills, all  -AC     [place]/Cues Needed (Dressing Goal 1, OT) modified independence  -AC     Time Frame (Dressing Goal 1, OT) long term goal (LTG);10 days  -AC       Row Name 01/07/24 0837          Toileting Goal 1 (OT)    Activity/Device (Toileting Goal 1, OT) toileting skills, all  -AC     [place] Level/Cues Needed (Toileting Goal 1, OT) modified independence  -AC     Time Frame (Toileting Goal 1, OT) long term goal (LTG);10 days  -AC       Row Name 01/07/24 0837          Problem Specific Goal 1 (OT)    Problem Specific Goal 1 (OT) patient will improve endurance to good for adls.  -AC     Time Frame (Problem Specific Goal 1, OT) long term goal (LTG);10 days  -AC       Row Name 01/07/24 0837          Therapy Assessment/Plan (OT)    Planned Therapy Interventions (OT) activity tolerance training;functional balance retraining;occupation/activity based interventions;ROM/therapeutic exercise;transfer/mobility retraining;patient/caregiver education/training;BADL retraining  -AC               User Key  (r) = Recorded By, (t) = Taken By, (c) = Cosigned By      Initials Name Provider Type    Anais Wilkins OT Occupational Therapist                   Clinical Impression       Row Name 01/07/24 0850          Pain Assessment     Pretreatment Pain Rating 1/10  -     Posttreatment Pain Rating 1/10  -     Pain Location - abdomen  -AC       Row Name 01/07/24 0835          Plan of Care Review    Plan of Care Reviewed With patient  -AC     Progress no change  -     Outcome Evaluation Patient presents with balance and endurance limitations that impede his/her ability to perform ADLS. The skills of a therapist are necessary to maximize independence with ADLs.  -       Row Name 01/07/24 0835          Therapy Assessment/Plan (OT)    Patient/Family Therapy Goal Statement (OT) Patient would like to maximize independence with adls.  -     Rehab Potential (OT) good, to achieve stated therapy goals  -     Criteria for Skilled Therapeutic Interventions Met (OT) yes;meets criteria;skilled treatment is necessary  -     Therapy Frequency (OT) 5 times/wk  -       Row Name 01/07/24 0835          Therapy Plan Review/Discharge Plan (OT)    Equipment Needs Upon Discharge (OT) walker, rolling;commode chair  -AC     Anticipated Discharge Disposition (OT) home with assist;home with home health  -       Row Name 01/07/24 0852          Positioning and Restraints    Pre-Treatment Position in bed  -AC     Post Treatment Position bed  -AC     In Bed fowlers;call light within reach;encouraged to call for assist;exit alarm on  -               User Key  (r) = Recorded By, (t) = Taken By, (c) = Cosigned By      Initials Name Provider Type    AC Anais Stanley, TROY Occupational Therapist                   Outcome Measures       Row Name 01/07/24 0838          How much help from another is currently needed...    Putting on and taking off regular lower body clothing? 3  -AC     Bathing (including washing, rinsing, and drying) 3  -AC     Toileting (which includes using toilet bed pan or urinal) 3  -AC     Putting on and taking off regular upper body clothing 4  -AC     Taking care of personal grooming (such as brushing teeth) 4  -AC     Eating meals 4  -AC      AM-PAC 6 Clicks Score (OT) 21  -AC       Row Name 01/07/24 0838          Functional Assessment    Outcome Measure Options AM-PAC 6 Clicks Daily Activity (OT);Optimal Instrument  -AC       Row Name 01/07/24 0838          Optimal Instrument    Optimal Instrument Optimal - 3  -AC     Bending/Stooping 2  -AC     Standing 2  -AC     Reaching 1  -AC     From the list, choose the 3 activities you would most like to be able to do without any difficulty Bending/stooping;Standing;Reaching  -AC     Total Score Optimal - 3 5  -AC               User Key  (r) = Recorded By, (t) = Taken By, (c) = Cosigned By      Initials Name Provider Type    Anais Wilkins OT Occupational Therapist                    Occupational Therapy Education       Title: PT OT SLP Therapies (Done)       Topic: Occupational Therapy (Done)       Point: ADL training (Done)       Description:   Instruct learner(s) on proper safety adaptation and remediation techniques during self care or transfers.   Instruct in proper use of assistive devices.                  Learning Progress Summary             Patient Acceptance, E, VU by  at 1/7/2024 0839                         Point: Home exercise program (Done)       Description:   Instruct learner(s) on appropriate technique for monitoring, assisting and/or progressing therapeutic exercises/activities.                  Learning Progress Summary             Patient Acceptance, E, VU by  at 1/7/2024 0839                         Point: Precautions (Done)       Description:   Instruct learner(s) on prescribed precautions during self-care and functional transfers.                  Learning Progress Summary             Patient Acceptance, E, VU by  at 1/7/2024 0839                         Point: Body mechanics (Done)       Description:   Instruct learner(s) on proper positioning and spine alignment during self-care, functional mobility activities and/or exercises.                  Learning Progress Summary              Patient Acceptance, E, VU by  at 1/7/2024 0839                                         User Key       Initials Effective Dates Name Provider Type Discipline     06/16/21 -  Anais Stanley OT Occupational Therapist OT                  OT Recommendation and Plan  Planned Therapy Interventions (OT): activity tolerance training, functional balance retraining, occupation/activity based interventions, ROM/therapeutic exercise, transfer/mobility retraining, patient/caregiver education/training, BADL retraining  Therapy Frequency (OT): 5 times/wk  Plan of Care Review  Plan of Care Reviewed With: patient  Progress: no change  Outcome Evaluation: Patient presents with balance and endurance limitations that impede his/her ability to perform ADLS. The skills of a therapist are necessary to maximize independence with ADLs.     Time Calculation:   Evaluation Complexity (OT)  Review Occupational Profile/Medical/Therapy History Complexity: expanded/moderate complexity  Assessment, Occupational Performance/Identification of Deficit Complexity: 1-3 performance deficits  Clinical Decision Making Complexity (OT): problem focused assessment/low complexity  Overall Complexity of Evaluation (OT): low complexity     Time Calculation- OT       Row Name 01/07/24 0840             Time Calculation- OT    OT Received On 01/07/24  -      OT Goal Re-Cert Due Date 01/16/24  -         Untimed Charges    OT Eval/Re-eval Minutes 31  -AC         Total Minutes    Untimed Charges Total Minutes 31  -AC       Total Minutes 31  -AC                User Key  (r) = Recorded By, (t) = Taken By, (c) = Cosigned By      Initials Name Provider Type     Anais Stanley OT Occupational Therapist                  Therapy Charges for Today       Code Description Service Date Service Provider Modifiers Qty    80630273029 HC OT EVAL LOW COMPLEXITY 3 1/7/2024 Anais Stanley OT GO 1                 Anais Stanley OT  1/7/2024

## 2024-01-07 NOTE — THERAPY EVALUATION
Acute Care - Physical Therapy Initial Evaluation  [facility]     Patient Name: Abimael Randall  : 1940  MRN: [id number]  Today's Date: 2024      Visit Dx:     ICD-10-CM ICD-9-CM   1. Impaired mobility and ADLs  Z74.09 V49.89    Z78.9    2. Calculus of gallbladder without cholecystitis without obstruction  K80.20 574.20   3. Status post cholecystectomy  Z90.49 V45.79   4. Difficulty walking  R26.2 719.7     Patient Active Problem List   Diagnosis    Sleep disorder    BPH (benign prostatic hyperplasia)    Artificial lens present    Posterior vitreous detachment of both eyes    Calculus of gallbladder without cholecystitis without obstruction    S/P cholecystectomy    Aftercare following surgery    Status post cholecystectomy     Past Medical History:   Diagnosis Date    BPH (benign prostatic hyperplasia) 2022    Cholecystitis     H/O prostatectomy     LAPAROSCOPIC  SUPRAPUBIC    Prostate disorder     Sleep disorder     Umbilical hernia      Past Surgical History:   Procedure Laterality Date    CATARACT EXTRACTION, BILATERAL      CHOLECYSTECTOMY N/A 2024    Procedure: PARTIAL CHOLECYSTECTOMY LAPAROSCOPIC WITH DAVINCI ROBOT;  Surgeon: Kristopher Gutierrez MD;  Location: [facility];  Service: Robotics - Epitiro;  Laterality: N/A;    SUPRAPUBIC PROSTATECTOMY      TONSILLECTOMY      VENTRAL HERNIA REPAIR N/A 2024    Procedure: UMBILICAL HERNIA REPAIR LAPAROSCOPIC WITH ConnotateINCI ROBOT;  Surgeon: Kristopher Gutierrez MD;  Location: [facility];  Service: Robotics - Epitiro;  Laterality: N/A;     PT Assessment (last 12 hours)       PT Evaluation and Treatment       Row Name 24 1100          Physical Therapy Time and Intention    Subjective Information no complaints  -CS     Document Type evaluation  -CS     Mode of Treatment individual therapy;physical therapy  -CS     Patient Effort good  -CS     Symptoms Noted During/After Treatment none  -CS       Row Name 24 1100           General Information    Patient Profile Reviewed yes  -CS     Patient Observations alert;cooperative;agree to therapy  -CS     Prior Level of Function independent:;all household mobility;gait;transfer;bed mobility;ADL's  Pt reports he has a cleaning lady that assists with him around the house and also completes most of his laundry  -CS     Equipment Currently Used at Home commode, 3-in-1;shower chair;walker, rolling  Pt reports he used the rolling walker PRN at his house if he needs it. Sleeps in the recliner PRN and has a shower chair in tub/shower combo  -CS     Existing Precautions/Restrictions fall  -CS     Barriers to Rehab none identified  -CS       Row Name 01/07/24 1100          Living Environment    Current Living Arrangements home  -CS     Home Accessibility stairs to enter home;stairs within home  -CS     People in Home alone  -CS     Primary Care Provided by self  reports juarez rojas can help if needed and has his cleaning lady.  -CS       Row Name 01/07/24 1100          Home Main Entrance    Number of Stairs, Main Entrance three  -CS     Stair Railings, Main Entrance railings safe and in good condition  -CS       Row Name 01/07/24 1100          Stairs Within Home, Primary    Number of Stairs, Within Home, Primary none  -CS       Row Name 01/07/24 1100          Pain    Pretreatment Pain Rating 0/10 - no pain  -CS     Posttreatment Pain Rating 0/10 - no pain  -CS       Row Name 01/07/24 1100          Cognition    Orientation Status (Cognition) oriented x 3  -CS       Row Name 01/07/24 1100          Range of Motion Comprehensive    General Range of Motion no range of motion deficits identified  -CS       Row Name 01/07/24 1100          Strength Comprehensive (MMT)    General Manual Muscle Testing (MMT) Assessment no strength deficits identified  -CS       Row Name 01/07/24 1100          Bed Mobility    Bed Mobility bed mobility (all) activities  -CS     All Activities, Cole (Bed Mobility) modified  independence  -CS     Assistive Device (Bed Mobility) bed rails  -CS     Comment, (Bed Mobility) Pt educated on log roll technique and was able to complete mostly correct but did require cues to limit trunk flexion/abdominal crunch to reduce discomfort.  -CS       Row Name 01/07/24 1100          Transfers    Transfers sit-stand transfer;stand-sit transfer  -CS       Row Name 01/07/24 1100          Sit-Stand Transfer    Sit-Stand [place] (Transfers) supervision;standby assist  -CS     Assistive Device (Sit-Stand Transfers) other (see comments)  no AD  -CS       Row Name 01/07/24 1100          Stand-Sit Transfer    Stand-Sit [place] (Transfers) supervision  -CS     Assistive Device (Stand-Sit Transfers) other (see comments)  no AD  -CS       Row Name 01/07/24 1100          Gait/Stairs (Locomotion)    Gait/Stairs Locomotion gait/ambulation assistive device  -     [place] Level (Gait) supervision;standby assist  -CS     Assistive Device (Gait) walker, front-wheeled  -CS     Distance in Feet (Gait) 100  -CS     Pattern (Gait) step-through  -CS     Bilateral Gait Deviations forward flexed posture  -CS       Row Name 01/07/24 1100          Safety Issues, Functional Mobility    Impairments Affecting Function (Mobility) balance;endurance/activity tolerance;pain  -CS       Row Name 01/07/24 1100          Balance    Balance Assessment standing dynamic balance  -CS     Dynamic Standing Balance standby assist  -CS     Position/Device Used, Standing Balance supported;walker, front-wheeled  -CS       Row Name             Wound 01/05/24 1503 umbilical area Incision    Wound - Properties Group Placement Date: 01/05/24  -JL Placement Time: 1503  -JL Present on Original Admission: N  -JL Location: umbilical area  -JL Primary Wound Type: Incision  -JL, TROCAR SITES X8, ONE INSUFFLATION PUNCTURE HOLE     Retired Wound - Properties Group Placement Date: 01/05/24  -JL Placement Time: 1503  -JL Present on Original  Admission: N  -JL Location: umbilical area  -JL Primary Wound Type: Incision  -JL, TROCAR SITES X8, ONE INSUFFLATION PUNCTURE HOLE     Retired Wound - Properties Group Date first assessed: 01/05/24  -JL Time first assessed: 1503  -JL Present on Original Admission: N  -JL Location: umbilical area  -JL Primary Wound Type: Incision  -JL, TROCAR SITES X8, ONE INSUFFLATION PUNCTURE HOLE       Row Name 01/07/24 1100          Plan of Care Review    Plan of Care Reviewed With patient  -CS     Progress no change  -CS     Outcome Evaluation Patient is present with limited endurance and decreased balance postsurgery and requiring supervision with functional transfers and ambulation using rolling walker.  Patient encouraged to continue ambulating with staff while here at the hospital.  Recommend patient have home health physical and occupational therapy follow-up once home to ensure safety in the home environment.  He will be discharged from physical therapy caseload.  -CS       Row Name 01/07/24 1100          Positioning and Restraints    Pre-Treatment Position in bed  -CS     Post Treatment Position bed  -CS     In Bed fowlers;call light within reach;encouraged to call for assist;exit alarm on  -CS       Row Name 01/07/24 1100          Therapy Assessment/Plan (PT)    Criteria for Skilled Interventions Met (PT) no problems identified which require skilled intervention  -CS     Therapy Frequency (PT) evaluation only  -CS       Row Name 01/07/24 1100          PT Evaluation Complexity    History, PT Evaluation Complexity 1-2 personal factors and/or comorbidities  -CS     Examination of Body Systems (PT Eval Complexity) total of 4 or more elements  -CS     Clinical Presentation (PT Evaluation Complexity) stable  -CS     Clinical Decision Making (PT Evaluation Complexity) low complexity  -CS     Overall Complexity (PT Evaluation Complexity) low complexity  -CS       Row Name 01/07/24 1100          Therapy Plan Review/Discharge Plan  (PT)    Therapy Plan Review (PT) evaluation/treatment results reviewed;patient  -CS       Row Name 01/07/24 1100          Physical Therapy Goals    Problem Specific Goal Selection (PT) problem specific goal 1, PT  -CS       Row Name 01/07/24 1100          Problem Specific Goal 1 (PT)    Problem Specific Goal 1 (PT) Complete physical therapy evaluation  -CS     Time Frame (Problem Specific Goal 1, PT) by discharge  -CS     Progress/Outcome (Problem Specific Goal 1, PT) goal met  -CS               User Key  (r) = Recorded By, (t) = Taken By, (c) = Cosigned By      Initials Name Provider Type    Neda Arndt, RN Registered Nurse    Kelly Clark, PT Physical Therapist                      PT Recommendation and Plan  Anticipated Discharge Disposition (PT): home with home health, home with assist  Therapy Frequency (PT): evaluation only  Plan of Care Reviewed With: patient  Progress: no change  Outcome Evaluation: Patient is present with limited endurance and decreased balance postsurgery and requiring supervision with functional transfers and ambulation using rolling walker.  Patient encouraged to continue ambulating with staff while here at the hospital.  Recommend patient have home health physical and occupational therapy follow-up once home to ensure safety in the home environment.  He will be discharged from physical therapy caseload.   Outcome Measures       Row Name 01/07/24 1100             How much help from another person do you currently need...    Turning from your back to your side while in flat bed without using bedrails? 4  -CS      Moving from lying on back to sitting on the side of a flat bed without bedrails? 4  -CS      Moving to and from a bed to a chair (including a wheelchair)? 3  -CS      Standing up from a chair using your arms (e.g., wheelchair, bedside chair)? 4  -CS      Climbing 3-5 steps with a railing? 3  -CS      To walk in hospital room? 3  -CS      AM-PAC 6 Clicks Score (PT) 21   -CS      Highest Level of Mobility Goal 6 --> Walk 10 steps or more  -CS         Functional Assessment    Outcome Measure Options AM-PAC 6 Clicks Basic Mobility (PT)  -CS                User Key  (r) = Recorded By, (t) = Taken By, (c) = Cosigned By      Initials Name Provider Type    CS Kelly Kay, PT Physical Therapist                     Time Calculation:    PT Charges       Row Name 01/07/24 1110             Time Calculation    PT Received On 01/07/24  -CS         Untimed Charges    PT Eval/Re-eval Minutes 33  -CS         Total Minutes    Untimed Charges Total Minutes 33  -CS       Total Minutes 33  -CS                User Key  (r) = Recorded By, (t) = Taken By, (c) = Cosigned By      Initials Name Provider Type    Kelly Clark PT Physical Therapist                  Therapy Charges for Today       Code Description Service Date Service Provider Modifiers Qty    [id number] HC PT EVAL LOW COMPLEXITY 3 1/7/2024 Kelly Kay, PT GP 1            PT G-Codes  Outcome Measure Options: AM-PAC 6 Clicks Basic Mobility (PT)  AM-PAC 6 Clicks Score (PT): 21  AM-PAC 6 Clicks Score (OT): 21    Kelly Kay PT  1/7/2024

## 2024-01-07 NOTE — SIGNIFICANT NOTE
01/07/24 1202   Plan   Plan Patient discharging home today. Surgeon indicating pt needs a walker and home health.  spoke to patient and he states that he already has a rolling walker at home. [facility]

## 2024-01-07 NOTE — NURSING NOTE
Patient had no reports of pain or nausea this shift. Cooperative with all care. Noted with forgetfulness at times. VSS.

## 2024-01-07 NOTE — PLAN OF CARE
Goal Outcome Evaluation:  Plan of Care Reviewed With: patient        Progress: no change  Outcome Evaluation: Patient is present with limited endurance and decreased balance postsurgery and requiring supervision with functional transfers and ambulation using rolling walker.  Patient encouraged to continue ambulating with staff while here at the hospital.  Recommend patient have home health physical and occupational therapy follow-up once home to ensure safety in the home environment.  He will be discharged from physical therapy caseload.      Anticipated Discharge Disposition (PT): home with home health, home with assist

## 2024-01-10 LAB
CYTO UR: NORMAL
LAB AP CASE REPORT: NORMAL
LAB AP CLINICAL INFORMATION: NORMAL
PATH REPORT.FINAL DX SPEC: NORMAL
PATH REPORT.GROSS SPEC: NORMAL

## 2024-01-31 ENCOUNTER — TELEPHONE (OUTPATIENT)
Dept: SURGERY | Facility: CLINIC | Age: 84
End: 2024-01-31
Payer: COMMERCIAL

## 2024-01-31 NOTE — TELEPHONE ENCOUNTER
PER DR EDMOND PATIENT NEEDS TO FOLLOW UP AFTER SURGERY. CALLED PATIENT BOTH NUMBERS ARE DISCONNECTED. LETTER SENT TO PATIENT.

## 2024-05-21 DIAGNOSIS — N40.0 BENIGN PROSTATIC HYPERPLASIA WITHOUT LOWER URINARY TRACT SYMPTOMS: ICD-10-CM

## 2024-05-21 RX ORDER — TAMSULOSIN HYDROCHLORIDE 0.4 MG/1
1 CAPSULE ORAL DAILY
Qty: 90 CAPSULE | Refills: 4 | OUTPATIENT
Start: 2024-05-21

## (undated) DEVICE — ARM DRAPE

## (undated) DEVICE — LAPAROVUE VISIBILITY SYSTEM LAPAROSCOPIC SOLUTIONS: Brand: LAPAROVUE

## (undated) DEVICE — ENDOPATH XCEL WITH OPTIVIEW TECHNOLOGY BLADELESS TROCARS WITH STABILITY SLEEVES: Brand: ENDOPATH XCEL OPTIVIEW

## (undated) DEVICE — JACKSON-PRATT 100CC BULB RESERVOIR: Brand: CARDINAL HEALTH

## (undated) DEVICE — 3 RING SUTURE PASSER - 16 CM: Brand: 3 RING SUTURE PASSER - 16 CM

## (undated) DEVICE — ENDOPATH PNEUMONEEDLE INSUFFLATION NEEDLES WITH LUER LOCK CONNECTORS 120MM: Brand: ENDOPATH

## (undated) DEVICE — 1000ML,PRESSURE INFUSER W/STOPCOCK: Brand: MEDLINE

## (undated) DEVICE — GLOVE,SURG,SENSICARE SLT,LF,PF,7.5: Brand: MEDLINE

## (undated) DEVICE — PENCL E/S HNDSWCH ROCKR CB

## (undated) DEVICE — SLV SCD KN/LEN ADJ EXPRSS BLENDED MD 1P/U

## (undated) DEVICE — SEAL

## (undated) DEVICE — SOL NACL 0.9PCT 1000ML

## (undated) DEVICE — GLOVE,SURG,SENSICARE SLT,LF,PF,6.5: Brand: MEDLINE

## (undated) DEVICE — LAPAROSCOPIC SMOKE EVACUATION SYSTEM ACTIVE AND PASSIVE: Brand: VALLEYLAB

## (undated) DEVICE — TIP COVER ACCESSORY

## (undated) DEVICE — TISSUE RETRIEVAL SYSTEM: Brand: INZII RETRIEVAL SYSTEM

## (undated) DEVICE — STERILE COTTON BALLS LARGE 5/P: Brand: MEDLINE

## (undated) DEVICE — ANTIBACTERIAL UNDYED BRAIDED (POLYGLACTIN 910), SYNTHETIC ABSORBABLE SUTURE: Brand: COATED VICRYL

## (undated) DEVICE — Device: Brand: ION

## (undated) DEVICE — DAVINCI-LF: Brand: MEDLINE INDUSTRIES, INC.

## (undated) DEVICE — INTENDED FOR TISSUE SEPARATION, AND OTHER PROCEDURES THAT REQUIRE A SHARP SURGICAL BLADE TO PUNCTURE OR CUT.: Brand: BARD-PARKER ® CARBON RIB-BACK BLADES

## (undated) DEVICE — STERILE POLYISOPRENE POWDER-FREE SURGICAL GLOVES WITH EMOLLIENT COATING: Brand: PROTEXIS

## (undated) DEVICE — SUCTION/IRRIGATOR: Brand: ENDOWRIST;DAVINCI SI

## (undated) DEVICE — SUT VIC PLS CTD BR 0 TIE 18IN VIL

## (undated) DEVICE — DRSNG SURESITE WNDW 4X4.5

## (undated) DEVICE — SYR LL TP 10ML STRL

## (undated) DEVICE — CANNULA SEAL

## (undated) DEVICE — BLAKE SILICONE DRAIN, 19 FR ROUND, HUBLESS WITH 1/4" TROCAR: Brand: BLAKE

## (undated) DEVICE — REDUCER: Brand: ENDOWRIST

## (undated) DEVICE — SOL IRR NACL 0.9PCT 3000ML